# Patient Record
Sex: FEMALE | Race: BLACK OR AFRICAN AMERICAN | NOT HISPANIC OR LATINO | Employment: FULL TIME | URBAN - METROPOLITAN AREA
[De-identification: names, ages, dates, MRNs, and addresses within clinical notes are randomized per-mention and may not be internally consistent; named-entity substitution may affect disease eponyms.]

---

## 2020-12-02 LAB — CRC RECOMMENDATION EXT: NORMAL

## 2024-07-03 ENCOUNTER — OFFICE VISIT (OUTPATIENT)
Dept: FAMILY MEDICINE | Facility: CLINIC | Age: 59
End: 2024-07-03
Payer: COMMERCIAL

## 2024-07-03 ENCOUNTER — DOCUMENTATION ONLY (OUTPATIENT)
Dept: FAMILY MEDICINE | Facility: CLINIC | Age: 59
End: 2024-07-03

## 2024-07-03 ENCOUNTER — HOSPITAL ENCOUNTER (OUTPATIENT)
Dept: RADIOLOGY | Facility: HOSPITAL | Age: 59
Discharge: HOME | End: 2024-07-03
Attending: NURSE PRACTITIONER
Payer: COMMERCIAL

## 2024-07-03 VITALS
DIASTOLIC BLOOD PRESSURE: 80 MMHG | RESPIRATION RATE: 20 BRPM | WEIGHT: 141 LBS | HEIGHT: 62 IN | SYSTOLIC BLOOD PRESSURE: 152 MMHG | TEMPERATURE: 98 F | HEART RATE: 63 BPM | BODY MASS INDEX: 25.95 KG/M2 | OXYGEN SATURATION: 99 %

## 2024-07-03 DIAGNOSIS — Z12.11 COLON CANCER SCREENING: ICD-10-CM

## 2024-07-03 DIAGNOSIS — R03.0 ELEVATED BLOOD PRESSURE READING: ICD-10-CM

## 2024-07-03 DIAGNOSIS — Z00.00 WELLNESS EXAMINATION: ICD-10-CM

## 2024-07-03 DIAGNOSIS — Z12.4 PAP SMEAR FOR CERVICAL CANCER SCREENING: ICD-10-CM

## 2024-07-03 DIAGNOSIS — Z12.39 ENCOUNTER FOR SCREENING FOR MALIGNANT NEOPLASM OF BREAST, UNSPECIFIED SCREENING MODALITY: ICD-10-CM

## 2024-07-03 DIAGNOSIS — Z12.39 ENCOUNTER FOR SCREENING FOR MALIGNANT NEOPLASM OF BREAST, UNSPECIFIED SCREENING MODALITY: Primary | ICD-10-CM

## 2024-07-03 PROCEDURE — 77067 SCR MAMMO BI INCL CAD: CPT | Mod: TC

## 2024-07-03 PROCEDURE — 77063 BREAST TOMOSYNTHESIS BI: CPT | Mod: 26,,, | Performed by: STUDENT IN AN ORGANIZED HEALTH CARE EDUCATION/TRAINING PROGRAM

## 2024-07-03 PROCEDURE — 77067 SCR MAMMO BI INCL CAD: CPT | Mod: 26,,, | Performed by: STUDENT IN AN ORGANIZED HEALTH CARE EDUCATION/TRAINING PROGRAM

## 2024-07-03 RX ORDER — CYANOCOBALAMIN (VITAMIN B-12) 500 MCG
1 TABLET ORAL DAILY
COMMUNITY
Start: 2022-09-07

## 2024-07-03 NOTE — PROGRESS NOTES
Subjective:      Patient ID: Claudette M Malveaux Washington is a 58 y.o. Black or  female      Chief Complaint: Annual Exam (Wellness)       Past Medical History:   Diagnosis Date    Raynaud's syndrome         HPI  Presents to clinic for Annual Wellness.    The patient states that she feels well and would describe her overall health as good.  She states that she currently exercises 2/x week; she plays tennis. The patient states that she eats a well-balanced diet      Preventative Health:  Labs due and ordered  Colorectal cancer screening: done in 2020 ([place]); Polyp removed; Repeat due 2025  Cervical cancer screening: PAP due; done in office today  Breast cancer screening: Due and ordered  Osteoporosis screening: Not yet due  Alcohol use: Occasional  Tobacco use: Denies  Illicit drug use: Denies    BP elevated today.  Denies any history of HTN.  States BP normal at home. Denies any headaches, weakness, dizziness, CP, or SOB. Denies any other problems.              Patient Care Team:  Rickie Pedraza MD as PCP - General (Internal Medicine)      Review of Systems   Constitutional: Negative.  Negative for activity change, appetite change, chills, fever and unexpected weight change.   HENT: Negative.  Negative for hearing loss, rhinorrhea and trouble swallowing.    Eyes: Negative.  Negative for discharge, redness and visual disturbance.   Respiratory: Negative.  Negative for chest tightness, shortness of breath and wheezing.    Cardiovascular:  Positive for palpitations. Negative for chest pain.   Gastrointestinal: Negative.  Negative for blood in stool, constipation, diarrhea and vomiting.   Endocrine: Negative.  Negative for polydipsia and polyuria.   Genitourinary: Negative.  Negative for difficulty urinating, dysuria, hematuria and menstrual problem.   Musculoskeletal:  Positive for arthralgias and joint swelling. Negative for neck pain.   Integumentary:  Negative.   Allergic/Immunologic:  Negative.    Neurological: Negative.  Negative for weakness and headaches.   Psychiatric/Behavioral: Negative.  Negative for confusion and dysphoric mood.    All other systems reviewed and are negative.          Objective:      Vitals:    07/03/24 0855   BP: (!) 152/80   Pulse:    Resp:    Temp:       Body mass index is 25.79 kg/m².     Physical Exam  Vitals reviewed. Exam conducted with a chaperone present (NEENA Jones).   Constitutional:       Appearance: Normal appearance.   HENT:      Head: Normocephalic.      Mouth/Throat:      Mouth: Mucous membranes are moist.   Eyes:      Conjunctiva/sclera: Conjunctivae normal.      Pupils: Pupils are equal, round, and reactive to light.   Cardiovascular:      Rate and Rhythm: Normal rate and regular rhythm.   Pulmonary:      Effort: Pulmonary effort is normal. No respiratory distress.      Breath sounds: Normal breath sounds.   Abdominal:      General: Bowel sounds are normal. There is no distension.      Palpations: Abdomen is soft.   Genitourinary:     General: Normal vulva.      Labia:         Right: No rash, tenderness or lesion.         Left: No rash, tenderness or lesion.       Vagina: Normal.      Cervix: Normal. No friability, lesion or erythema.      Rectum: Normal.   Musculoskeletal:         General: Normal range of motion.      Cervical back: Normal range of motion and neck supple.   Skin:     General: Skin is warm and dry.   Neurological:      Mental Status: She is alert and oriented to person, place, and time.   Psychiatric:         Mood and Affect: Mood normal.            Current Outpatient Medications:     folic acid 0.8 mg Cap, Take 1 mg by mouth once daily., Disp: , Rfl:     Assessment & Plan:     Problem List Items Addressed This Visit          Cardiac/Vascular    Elevated blood pressure reading     BP elevated; Asymptomatic  No history of HTN  Daily BP check; bring log to all appointments  Report to ED for any BP >200/100, SOB, CP, and/or worsening  symptoms  RTC in 1 month for reevaluation  Verbalizes all understanding              Renal/    Pap smear for cervical cancer screening - Primary     PAP collected today; tolerated well  Will call with results         Relevant Orders    Liquid-Based Pap Smear, Screening       Other    Wellness examination     Labs due and ordered  Colorectal cancer screening: done in 2020 ([place]); Polyp removed; Repeat due 2025;   Cervical cancer screening: PAP due; done in office today  Breast cancer screening: Due and ordered  Osteoporosis screening: Not yet due  Alcohol use: Occasional  Tobacco use: Denies  Illicit drug use: Denies         Relevant Orders    CBC Auto Differential    Comprehensive Metabolic Panel    Lipid Panel    TSH    Hemoglobin A1C    Urinalysis    Hepatitis C Antibody    HIV 1/2 Ag/Ab (4th Gen)

## 2024-07-03 NOTE — ASSESSMENT & PLAN NOTE
Labs due and ordered  Colorectal cancer screening: done in 2020 ([place]); Polyp removed; Repeat due 2025;   Cervical cancer screening: PAP due; done in office today  Breast cancer screening: Due and ordered  Osteoporosis screening: Not yet due  Alcohol use: Occasional  Tobacco use: Denies  Illicit drug use: Denies

## 2024-07-03 NOTE — ASSESSMENT & PLAN NOTE
BP elevated; Asymptomatic  No history of HTN  Daily BP check; bring log to all appointments  Report to ED for any BP >200/100, SOB, CP, and/or worsening symptoms  RTC in 1 month for reevaluation  Verbalizes all understanding

## 2024-07-08 ENCOUNTER — TELEPHONE (OUTPATIENT)
Dept: FAMILY MEDICINE | Facility: CLINIC | Age: 59
End: 2024-07-08
Payer: COMMERCIAL

## 2024-07-08 DIAGNOSIS — Z91.89 AT HIGH RISK FOR BREAST CANCER: Primary | ICD-10-CM

## 2024-07-08 NOTE — TELEPHONE ENCOUNTER
Breast surgeon I would recommend is Dr. Thelma Morales or Dr. Luis Connolly; is she agreeable to either?

## 2024-07-08 NOTE — TELEPHONE ENCOUNTER
----- Message from Sofy Willard MD sent at 7/5/2024 11:50 AM CDT -----    ----- Message -----  From: Interface, Rad Results In  Sent: 7/5/2024  11:16 AM CDT  To: Diana Winters NP

## 2024-07-08 NOTE — TELEPHONE ENCOUNTER
Pt notified or mammogram results and high risk of breast cancer. Pt amendable to rotating MRI and mammograms and to seeing a breast specialist. Pt would like call back with breast specialist name. Please advise. Thanks

## 2024-07-08 NOTE — TELEPHONE ENCOUNTER
Please call patient  Her mammogram did not show any signs of breast cancer however they did calculate based on her history, her lifetime risk of breast cancer is high at 30%  Given this information our breast radiologist do recommend that we start alternating a breast MRI every 6 months, then the following 6 months with a breast mammogram to monitor her; also that we get her established with a breast specialist  I do agree with this recommendation as we have seen a higher incidence of breast cancer in our region, if she is agreeable to MRI or atleast to establish with a breast specialist please let me know so I can work on this  Thanks

## 2024-07-09 ENCOUNTER — DOCUMENTATION ONLY (OUTPATIENT)
Dept: FAMILY MEDICINE | Facility: CLINIC | Age: 59
End: 2024-07-09
Payer: COMMERCIAL

## 2024-07-09 LAB — PAP RECOMMENDATION EXT: NORMAL

## 2024-07-09 NOTE — TELEPHONE ENCOUNTER
Spoke with pt  Advised that Dr Pedraza recommends either Dr Morales or Dr Connolly  Pt stated she prefers a female physician   Please place referral to Dr Thelma Morales  Thanks

## 2024-07-09 NOTE — TELEPHONE ENCOUNTER
I have signed for the following orders AND/OR meds. Please notify the patient and ask the patient to schedule the testing and/or information about any medications that were sent.         Orders Placed This Encounter   Procedures    MRI Breast Bilateral Without Contrast     Standing Status:   Future     Standing Expiration Date:   7/9/2025     Order Specific Question:   Does the patient have or ever had a pacemaker or a defibrillator (Note: Some facilities may not be able to schedule an MRI for patients with pacemakers and defibrillators. You should contact your local radiology dept to determine if this is the case.)?     Answer:   No     Order Specific Question:   Does the patient have an aneurysm or surgical clip, pump, nerve/brain stimulator, middle/inner ear prosthesis, or other metal implant or foreign object (bullet, shrapnel)? If they have a card related to their implant, ask them to bring it. Issues related to the implant may cause the MRI to be delayed.     Answer:   No     Order Specific Question:   Will the patient require po anxiolysis or sedation?     Answer:   No     Order Specific Question:   May the Radiologist modify the order per protocol to meet the clinical needs of the patient?     Answer:   Yes     Order Specific Question:   Does the patient have on a skin patch for medication with aluminized backing?     Answer:   No    Ambulatory referral/consult to Breast Surgery     Standing Status:   Future     Standing Expiration Date:   8/9/2025     Referral Priority:   Routine     Referral Type:   Consultation     Referral Reason:   Specialty Services Required     Referred to Provider:   Thelma Morales MD     Requested Specialty:   Breast Surgery     Number of Visits Requested:   1

## 2024-07-29 RX ORDER — IBUPROFEN 200 MG
200 TABLET ORAL
COMMUNITY

## 2024-07-30 ENCOUNTER — OFFICE VISIT (OUTPATIENT)
Dept: SURGERY | Facility: CLINIC | Age: 59
End: 2024-07-30
Payer: COMMERCIAL

## 2024-07-30 VITALS
WEIGHT: 143.19 LBS | BODY MASS INDEX: 26.35 KG/M2 | SYSTOLIC BLOOD PRESSURE: 135 MMHG | HEIGHT: 62 IN | RESPIRATION RATE: 18 BRPM | TEMPERATURE: 98 F | DIASTOLIC BLOOD PRESSURE: 80 MMHG | HEART RATE: 63 BPM | OXYGEN SATURATION: 100 %

## 2024-07-30 DIAGNOSIS — Z80.3 FAMILY HISTORY OF BREAST CANCER: ICD-10-CM

## 2024-07-30 DIAGNOSIS — Z12.31 SCREENING MAMMOGRAM FOR BREAST CANCER: ICD-10-CM

## 2024-07-30 DIAGNOSIS — Z91.89 AT HIGH RISK FOR BREAST CANCER: Primary | ICD-10-CM

## 2024-07-30 PROCEDURE — 3008F BODY MASS INDEX DOCD: CPT | Mod: CPTII,S$GLB,,

## 2024-07-30 PROCEDURE — 3044F HG A1C LEVEL LT 7.0%: CPT | Mod: CPTII,S$GLB,,

## 2024-07-30 PROCEDURE — 3075F SYST BP GE 130 - 139MM HG: CPT | Mod: CPTII,S$GLB,,

## 2024-07-30 PROCEDURE — 99205 OFFICE O/P NEW HI 60 MIN: CPT | Mod: S$GLB,,,

## 2024-07-30 PROCEDURE — 99999 PR PBB SHADOW E&M-EST. PATIENT-LVL IV: CPT | Mod: PBBFAC,,,

## 2024-07-30 PROCEDURE — 3079F DIAST BP 80-89 MM HG: CPT | Mod: CPTII,S$GLB,,

## 2024-07-30 PROCEDURE — 1159F MED LIST DOCD IN RCRD: CPT | Mod: CPTII,S$GLB,,

## 2024-07-30 PROCEDURE — 1160F RVW MEDS BY RX/DR IN RCRD: CPT | Mod: CPTII,S$GLB,,

## 2024-07-30 NOTE — PROGRESS NOTES
[facility] Breast Surg  Breast Surgical Oncology  New Patient Office Visit - H&P      Referring Provider: Dr. Rickie Pedraza  PCP: Rickie Pedraza MD   Care Team:  OBGYN: No data on file.    Chief Complaint:   Chief Complaint   Patient presents with    Breast Cancer Screening     New High Risk patient visit.         Subjective:     HPI:  Claudette M Malveaux Washington is a 59 y.o. female who presents on 7/30/2024 for evaluation of risk for breast cancer. Based on the Tyrer-Cuzick Breast Cancer Risk Model, her lifetime risk is calculated to be 34.4% and Tere 5 year risk score 2.2%.    Patient is doing well today. She currently denies any breast issues including rashes, redness, pain, swelling, nipple discharge, or new lumps/masses.  She underwent screening mammogram earlier this month which resulted as negative.  She has never undergone any prior breast surgeries or breast biopsies.  She has not undergone genetic testing.  Patient is postmenopausal and she went through menopause around the age of 52.  She performs regular self-breast exams.  Patient states she lives a relatively healthy lifestyle.  She exercises by playing tennis with her niece a couple of times per week.  She does not smoke and she drinks alcohol in moderation.  She has no other questions or concerns today.      Of note, patient's mother was diagnosed with breast cancer at the age of 65.  Her mother was treated with lumpectomy, radiation, and chemoprevention.  Patient states her mother was in remission for about 5 years before she developed angiosarcoma of her breast which she passed away from shortly after diagnosis.  Patient states angiosarcoma was caused by radiation.  Patient's father was diagnosed with leukemia at age 82.  Patient's maternal grandmother was diagnosed with breast cancer at the age of 80.  She has a paternal aunt who was diagnosed with colon cancer at the age of 60.  Patient states none of these  family members were genetically tested.    Imagin/3/2024 SC MG - There is no mammographic evidence of malignancy. BI-RADS: 1 Negative     Pathology:   none    OB/GYN History:  Age at Menarche Onset: 15  Menopausal Status: postmenopausal, LMP: No LMP recorded. Patient is postmenopausal.  Hysterectomy/Oophorectomy: NA  Hormonal birth control (duration): 10 years   Pregnancy History:   Age at first live birth: 31  Hormone Replacement Therapy: No, none    Other:  MG breast density: BIRADS D   Prior thoracic RT: none  Genetic testing: none  Ashkenazi Jew descent: No    Family History:  Family History   Problem Relation Name Age of Onset    Breast cancer Mother  65    Heart disease Mother      Leukemia Father      Hypertension Father      Cerebral aneurysm Brother      Breast cancer Paternal Grandmother  75    Colon cancer Paternal Aunt  70        Patient History:  Past Medical History:   Diagnosis Date    Raynaud's syndrome        Past Surgical History:   Procedure Laterality Date     SECTION      x 2       Social History     Socioeconomic History    Marital status:    Tobacco Use    Smoking status: Never    Smokeless tobacco: Never   Substance and Sexual Activity    Alcohol use: Yes     Comment: Socially (3-4 times a week)    Drug use: Never    Sexual activity: Yes     Partners: Male     Social Determinants of Health     Financial Resource Strain: Low Risk  (2024)    Overall Financial Resource Strain (CARDIA)     Difficulty of Paying Living Expenses: Not very hard   Food Insecurity: No Food Insecurity (2024)    Hunger Vital Sign     Worried About Running Out of Food in the Last Year: Never true     Ran Out of Food in the Last Year: Never true   Physical Activity: Sufficiently Active (2024)    Exercise Vital Sign     Days of Exercise per Week: 3 days     Minutes of Exercise per Session: 150+ min   Stress: Stress Concern Present (2024)    [employer]  "- Occupational Stress Questionnaire     Feeling of Stress : To some extent   Housing Stability: Unknown (7/1/2024)    Housing Stability Vital Sign     Unable to Pay for Housing in the Last Year: No       Immunization History   Administered Date(s) Administered    COVID-19, MRNA, LN-S, PF (MODERNA FULL 0.5 ML DOSE) 03/31/2021, 04/30/2021, 03/01/2022    Influenza - Quadrivalent - PF *Preferred* (6 months and older) 09/24/2022    Zoster Recombinant 08/17/2021, 11/16/2021       Medications/Allergies:    Current Outpatient Medications:     folic acid 0.8 mg Cap, Take 1 mg by mouth once daily., Disp: , Rfl:     ibuprofen (ADVIL,MOTRIN) 200 MG tablet, Take 200 mg by mouth as needed for Pain., Disp: , Rfl:      Review of patient's allergies indicates:   Allergen Reactions    Sulfa (sulfonamide antibiotics) Hives and Itching       Review of Systems:  All pertinent history mentioned in HPI.     Objective:     Vitals:  Vitals:    07/30/24 1256   BP: 135/80   BP Location: Left arm   Patient Position: Sitting   BP Method: Medium (Automatic)   Pulse: 63   Resp: 18   Temp: 97.9 °F (36.6 °C)   TempSrc: Oral   SpO2: 100%   Weight: 65 kg (143 lb 3.2 oz)   Height: 5' 2" (1.575 m)       Body mass index is 26.19 kg/m².     Physical Exam:  General: The patient is awake, alert and oriented times three. The patient is well nourished and in no acute distress.  Neck: There is no evidence of palpable cervical, supraclavicular or axillary adenopathy. The neck is supple. The thyroid is not enlarged.  Musculoskeletal: The patient has a normal range of motion of her bilateral upper extremities.  Chest: Examination of the chest wall fails to reveal any obvious abnormalities.  The lungs are clear to auscultation bilaterally without rales, rhonchi, or wheezing.  Cardiovascular: The heart has a regular rate and rhythm without murmurs, gallops or rubs.  Breast:   Right:  Examination of right breast fails to reveal any dominant masses or areas of " significant focal nodularity. The nipple is everted without evidence of discharge. There is no skin dimpling with movement of the pectoralis. There is no significant skin changes overlying the breast.   Left:  Examination of the left breast fails to reveal any dominant masses or areas of significant focal nodularity. The nipple is everted without evidence of discharge. There is no skin dimpling with movement of the pectoralis. There are no significant skin changes overlying the breast.  Abdomen: The abdomen is soft, flat, nontender and nondistended with no palpable masses or organomegaly.  Integumentary: no rashes or skin lesions present  Neurologic: cranial nerves intact, no signs of peripheral neurological deficit, motor/sensory function intact    Assessment:     Patient Active Problem List   Diagnosis    Family history of breast cancer in mother    Pap smear for cervical cancer screening    Wellness examination    Elevated blood pressure reading        Claudette was seen today for breast cancer screening.    Diagnoses and all orders for this visit:    At high risk for breast cancer  -     Ambulatory referral/consult to Breast Surgery  -     Mammo Digital Screening Bilat w/ Jf; Future  -     MRI Screening Breast W/WO Contrast, W/CAD, Thomas; Future    Screening mammogram for breast cancer  -     Mammo Digital Screening Bilat w/ Jf; Future    Family history of breast cancer  -     Mammo Digital Screening Bilat w/ Jf; Future  -     MRI Screening Breast W/WO Contrast, W/CAD, Thomas; Future           --------------------------------------------------------------------------------------------------------------  After the initial clinical evaluation nearly 30 minutes were on counseling the patients regarding the options for management. Risk reduction strategies were discussed.     1. Lifestyle factors: As with other types of cancer, studies continue to show that various lifestyle factors may contribute to the  development of breast cancer.     Weight: Recent studies have shown that postmenopausal women who are overweight or obese have an increased risk of breast cancer. These women also have a higher risk of having the cancer come back after treatment.     Physical activity: Decreased physical activity is associated with an increased risk of developing breast cancer and a higher risk of having the cancer come back after treatment. Regular physical activity may protect against breast cancer by helping women maintain a healthy body weight, lowering hormone levels, or causing changes in a womens metabolism or immune factors.     Alcohol: Current research suggests that having more than 1 to 2 alcoholic drinks, including beer, wine, and spirits, per day raises the risk of breast cancer, as well as the risk of having the cancer come back after treatment.     Food: There is no reliable research that confirms that eating or avoiding specific foods reduces the risk of developing breast cancer or having the cancer come back after treatment. However, eating more fruits and vegetables and fewer animal fats is linked with many health benefits.     2. Prevention:  Surgery to lower cancer risk: For women with BRCA1 or BRCA2 genetic mutations, which substantially increase the risk of breast cancer, preventive removal of the breasts may be considered. The procedure, called a prophylactic mastectomy, appears to reduce the risk of developing breast cancer by at least 95%. Women with these mutations should also consider the preventive removal of the ovaries and fallopian tubes, called a prophylactic salpingo-oophorectomy. This procedure can reduce the risk of developing ovarian cancer, as well as breast cancer, by stopping the ovaries from making estrogen.      Drugs to lower cancer risk (Chemoprevention): Women who have a higher than usual risk of developing breast cancer may consider certain drugs that may help prevent breast cancer. This  "approach may also be called "chemoprevention." For breast cancer, this is the use of hormone-blocking drugs to reduce cancer risk. The drugs, tamoxifen (Soltamox) and raloxifene (Evista), are approved by the U.S. Food and Drug Administration (FDA) to lower breast cancer risk. These drugs are called selective estrogen receptor modulators (SERMs) and are not chemotherapy. A SERM is a medication that blocks estrogen receptors in some tissues and not others. Both women who have gone through menopause and those who have not may take tamoxifen. Raloxifene is only approved for women who have gone through menopause. Each drug also has different side effects.     Aromatase inhibitors (AIs) have also been shown to lower breast cancer risk. AIs are a type of hormone-blocking treatment that reduces the amount of estrogen in a woman's body by stopping tissues and organs other than the ovaries from producing estrogen. They can only be used by women who have gone through menopause. However, no AIs have been approved by the FDA for lowering breast cancer risk in women who do not have the disease.     3. Surveillance: Women with a known genetic mutation should follow screening guidelines per the NCCN guidelines. Women with no known genetic mutation  and found to be at greater than 20 percent average lifetime risk of breast cancer are recommended for the following screening recommendations:     Clinical Breast exam: Every 6-12 months starting at age found to be at increased risk by risk model     Mammogram: Per NCCN guidelines, recommended every year starting 10 years younger than the youngest breast cancer case in the family (but not before age 30). May consider beginning breast MRIs at age 30 per ACR guidelines if desired by patient or other clinical considerations. May also consider getting a baseline MG at time of initial high risk consultation, if not already obtained.     Breast MRI: Per NCCN guidelines, recommended every year " starting 10 years younger than the youngest breast cancer case in the family (but not before age 25). May consider beginning breast MRIs at age 30 per ACR guidelines if desired by patient or other clinical considerations. If patient has a first-degree relative with a BRCA1/2 gene mutation, youre encouraged to get genetic counseling and/or testing before getting MRI as part of screening (for those who do not wish to have genetic testing, MRI is recommended). Breast MRI in combination with mammography is better than mammography alone at finding breast cancer in certain women at higher than average risk.     --------------------------------------------------------------------------------------------------------------     Plan:       1. Lifestyle - Healthy lifestyle guidelines were reviewed. She was encouraged to engage in regular exercise, maintain a healthy body weight, and avoid excessive alcohol consumption. Healthy nutritional guidelines were also discussed. Self-breast examination was reviewed with the patient in detail and she was encouraged to perform this on a monthly basis.    2. Surveillance - She desires undergoing high risk screening with annual screening mammograms and breast MRIs. In the absence of significant clinical findings in the interval, I recommend breast MRI in January 2025 in screening mammogram in July 2025.  RTC in 1 year.    3. Prevention - We had a brief discussion/education about indications for preventative mastectomy or chemoprevention. Patient displayed no interest in starting chemoprevention.  Patient's 5 year Tere score is 2.2%.      4. Genetics - According to NCCN guidelines, she does not currently meet criteria for genetic testing. Will continue to monitor.    5. Other routine screening exams:   -  Recommend annual follow up with PCP   -  Recommend annual follow up with GYN for pap smears/gynecologic exams and CBE   -  GI: Recommend start colorectal cancer screening at age 45 in  average risk individuals. This can be done either with a sensitive test that looks for signs of cancer in a persons stool (a stool-based test), or with an exam that looks at the colon and rectum (a visual exam). Patient's at an increased risk for CRC (ex. Personal or family history of CRC, certain types of polyps, genetic disorders, IBD, or hx of abdominal radiation) should start screening prior to age 45.         All of her questions were answered. She was advised to call if she develops any questions or concerns.    Gila Acuña PA-C     --------------------------------------------------------------------------------------------------------------  Total time on the date of the visit ranged from 60-74 mins (48895). Total time includes both face-to-face and non-face-to-face time personally spent by myself on the day of the visit.    Non-face-to-face time included:  _X_ preparing to see the patient such as reviewing the patient record  _X_ obtaining and reviewing separately obtained history  _X_ independently interpreting results  _X_ documenting clinical information in electronic health record.    Face-to-face time included:  _X_ performing an appropriate history and examination  _X_ communicating results to the patient  _X_ counseling and educating the patient  __ ordering appropriate medications  _x_ ordering appropriate tests  _X_ ordering appropriate procedures (including follow-up)  _X_ answering any questions the patient had    Total Time spent on date of visit: 74 minutes

## 2024-08-06 ENCOUNTER — OFFICE VISIT (OUTPATIENT)
Dept: FAMILY MEDICINE | Facility: CLINIC | Age: 59
End: 2024-08-06
Payer: COMMERCIAL

## 2024-08-06 VITALS
SYSTOLIC BLOOD PRESSURE: 137 MMHG | TEMPERATURE: 97 F | BODY MASS INDEX: 25.88 KG/M2 | DIASTOLIC BLOOD PRESSURE: 76 MMHG | WEIGHT: 140.63 LBS | RESPIRATION RATE: 19 BRPM | OXYGEN SATURATION: 98 % | HEIGHT: 62 IN | HEART RATE: 73 BPM

## 2024-08-06 DIAGNOSIS — R03.0 ELEVATED BLOOD PRESSURE READING: Primary | ICD-10-CM

## 2024-08-06 PROCEDURE — 3075F SYST BP GE 130 - 139MM HG: CPT | Mod: CPTII,,, | Performed by: NURSE PRACTITIONER

## 2024-08-06 PROCEDURE — 1160F RVW MEDS BY RX/DR IN RCRD: CPT | Mod: CPTII,,, | Performed by: NURSE PRACTITIONER

## 2024-08-06 PROCEDURE — 3008F BODY MASS INDEX DOCD: CPT | Mod: CPTII,,, | Performed by: NURSE PRACTITIONER

## 2024-08-06 PROCEDURE — 3044F HG A1C LEVEL LT 7.0%: CPT | Mod: CPTII,,, | Performed by: NURSE PRACTITIONER

## 2024-08-06 PROCEDURE — 3078F DIAST BP <80 MM HG: CPT | Mod: CPTII,,, | Performed by: NURSE PRACTITIONER

## 2024-08-06 PROCEDURE — 1159F MED LIST DOCD IN RCRD: CPT | Mod: CPTII,,, | Performed by: NURSE PRACTITIONER

## 2024-08-06 PROCEDURE — 99214 OFFICE O/P EST MOD 30 MIN: CPT | Mod: ,,, | Performed by: NURSE PRACTITIONER

## 2024-08-26 ENCOUNTER — OFFICE VISIT (OUTPATIENT)
Dept: FAMILY MEDICINE | Facility: CLINIC | Age: 59
End: 2024-08-26
Payer: COMMERCIAL

## 2024-08-26 VITALS
SYSTOLIC BLOOD PRESSURE: 137 MMHG | WEIGHT: 140 LBS | HEIGHT: 62 IN | TEMPERATURE: 98 F | DIASTOLIC BLOOD PRESSURE: 85 MMHG | HEART RATE: 63 BPM | BODY MASS INDEX: 25.76 KG/M2 | RESPIRATION RATE: 18 BRPM | OXYGEN SATURATION: 99 %

## 2024-08-26 DIAGNOSIS — M79.605 LEFT LEG PAIN: Primary | ICD-10-CM

## 2024-08-26 PROCEDURE — 3008F BODY MASS INDEX DOCD: CPT | Mod: CPTII,,, | Performed by: FAMILY MEDICINE

## 2024-08-26 PROCEDURE — 3075F SYST BP GE 130 - 139MM HG: CPT | Mod: CPTII,,, | Performed by: FAMILY MEDICINE

## 2024-08-26 PROCEDURE — 3044F HG A1C LEVEL LT 7.0%: CPT | Mod: CPTII,,, | Performed by: FAMILY MEDICINE

## 2024-08-26 PROCEDURE — 99213 OFFICE O/P EST LOW 20 MIN: CPT | Mod: ,,, | Performed by: FAMILY MEDICINE

## 2024-08-26 PROCEDURE — 1159F MED LIST DOCD IN RCRD: CPT | Mod: CPTII,,, | Performed by: FAMILY MEDICINE

## 2024-08-26 PROCEDURE — 3079F DIAST BP 80-89 MM HG: CPT | Mod: CPTII,,, | Performed by: FAMILY MEDICINE

## 2024-08-26 PROCEDURE — 1160F RVW MEDS BY RX/DR IN RCRD: CPT | Mod: CPTII,,, | Performed by: FAMILY MEDICINE

## 2024-08-26 RX ORDER — KETOROLAC TROMETHAMINE 10 MG/1
10 TABLET, FILM COATED ORAL EVERY 6 HOURS
Qty: 20 TABLET | Refills: 0 | Status: SHIPPED | OUTPATIENT
Start: 2024-08-26 | End: 2024-08-31

## 2024-08-26 RX ORDER — CYCLOBENZAPRINE HCL 5 MG
5 TABLET ORAL 3 TIMES DAILY PRN
Qty: 21 TABLET | Refills: 0 | Status: SHIPPED | OUTPATIENT
Start: 2024-08-26 | End: 2024-09-02

## 2024-08-28 NOTE — PROGRESS NOTES
Subjective:      Patient ID: Claudette M Malveaux Washington is a 59 y.o. female.    Chief Complaint: Leg Pain (Left posterior thigh, on and off 5 years)    Disclaimer:  This note is prepared using voice recognition software and as such is likely to have errors despite attempts at proofreading. Please contact me for questions.     59-year-old female who presents for acute on chronic left posterior thigh pain.  The patient states that symptoms have been intermittent for the last few years.  She states that she has been diagnosed with multiple possible concerns including gluteal bursitis.  She describes the pain as originating in the buttocks and occasionally lower back as well as radiating to the thigh.  She denies numbness and tingling.  The patient also denies any trauma.      Past Medical History:   Diagnosis Date    Raynaud's syndrome         Current Outpatient Medications on File Prior to Visit   Medication Sig Dispense Refill    folic acid 0.8 mg Cap Take 1 mg by mouth once daily.      ibuprofen (ADVIL,MOTRIN) 200 MG tablet Take 200 mg by mouth as needed for Pain.       No current facility-administered medications on file prior to visit.        Review of patient's allergies indicates:   Allergen Reactions    Sulfa (sulfonamide antibiotics) Hives and Itching      Review of Systems   HENT:  Negative for hearing loss.    Eyes:  Negative for discharge.   Respiratory:  Negative for wheezing.    Cardiovascular:  Negative for chest pain and palpitations.   Gastrointestinal:  Negative for blood in stool, constipation, diarrhea and vomiting.   Genitourinary:  Negative for dysuria and hematuria.   Musculoskeletal:  Positive for myalgias. Negative for falls and neck pain.   Neurological:  Negative for weakness and headaches.   Endo/Heme/Allergies:  Negative for polydipsia.       Objective:     Vitals:    08/26/24 1556   BP: 137/85   Pulse: 63   Resp: 18   Temp: 98.3 °F (36.8 °C)   TempSrc: Temporal   SpO2: 99%   Weight:  "63.5 kg (140 lb)   Height: 5' 2" (1.575 m)     Physical Exam  Constitutional:       General: She is not in acute distress.     Appearance: Normal appearance. She is not ill-appearing, toxic-appearing or diaphoretic.   HENT:      Head: Normocephalic and atraumatic.      Right Ear: External ear normal.      Left Ear: External ear normal.      Nose: Nose normal.   Eyes:      Extraocular Movements: Extraocular movements intact.      Conjunctiva/sclera: Conjunctivae normal.   Pulmonary:      Effort: Pulmonary effort is normal. No respiratory distress.   Musculoskeletal:      Cervical back: Normal range of motion.      Lumbar back: Tenderness present. No swelling, edema, deformity, signs of trauma, spasms or bony tenderness. Normal range of motion. Negative right straight leg raise test and negative left straight leg raise test.      Right hip: Normal range of motion.      Left hip: Bony tenderness (Iliac crests) present. No deformity, tenderness or crepitus. Normal range of motion. Normal strength.      Right upper leg: No swelling, edema or deformity.      Left upper leg: Tenderness present. No swelling, edema, deformity or bony tenderness.      Comments: Negative FADIR, LAURA and obturator signs   Skin:     Coloration: Skin is not pale.   Neurological:      General: No focal deficit present.      Mental Status: She is alert and oriented to person, place, and time. Mental status is at baseline.   Psychiatric:         Mood and Affect: Mood normal.         Behavior: Behavior normal.         Thought Content: Thought content normal.         Judgment: Judgment normal.         Assessment:     1. Left leg pain      Plan:     1. Left leg pain  - cyclobenzaprine (FLEXERIL) 5 MG tablet; Take 1 tablet (5 mg total) by mouth 3 (three) times daily as needed for Muscle spasms.  Dispense: 21 tablet; Refill: 0  - ketorolac (TORADOL) 10 mg tablet; Take 1 tablet (10 mg total) by mouth every 6 (six) hours. for 5 days  Dispense: 20 tablet; " Refill: 0  ROM exercises given, HEP, warm compresses, consider PT if pain does not improve  Flexeril 5mg TID PRN, (counseled patient on not taking medication while driving or operating heavy machinery)  Continue NSAIDs PRN, may alternate with Tylenol  Contact clinic for any questions or concerns and notify MD if symptoms worsen or not improving.       Follow up if symptoms worsen or fail to improve.  Claudette M Malveaux Washington was given education on their disease process and medications.

## 2024-09-25 ENCOUNTER — HOSPITAL ENCOUNTER (EMERGENCY)
Facility: HOSPITAL | Age: 59
Discharge: HOME | End: 2024-09-25
Attending: EMERGENCY MEDICINE
Payer: COMMERCIAL

## 2024-09-25 VITALS
WEIGHT: 138 LBS | SYSTOLIC BLOOD PRESSURE: 158 MMHG | TEMPERATURE: 98 F | BODY MASS INDEX: 25.4 KG/M2 | HEART RATE: 69 BPM | OXYGEN SATURATION: 100 % | DIASTOLIC BLOOD PRESSURE: 95 MMHG | HEIGHT: 62 IN | RESPIRATION RATE: 19 BRPM

## 2024-09-25 DIAGNOSIS — S62.102A LEFT WRIST FRACTURE, CLOSED, INITIAL ENCOUNTER: ICD-10-CM

## 2024-09-25 DIAGNOSIS — S69.92XA LEFT WRIST INJURY: ICD-10-CM

## 2024-09-25 PROCEDURE — 96372 THER/PROPH/DIAG INJ SC/IM: CPT

## 2024-09-25 PROCEDURE — 99285 EMERGENCY DEPT VISIT HI MDM: CPT | Mod: 25

## 2024-09-25 PROCEDURE — 63600175 PHARM REV CODE 636 W HCPCS: Performed by: EMERGENCY MEDICINE

## 2024-09-25 PROCEDURE — 25605 CLTX DST RDL FX/EPHYS SEP W/: CPT | Mod: LT

## 2024-09-25 PROCEDURE — 63600175 PHARM REV CODE 636 W HCPCS

## 2024-09-25 PROCEDURE — 25000003 PHARM REV CODE 250: Performed by: EMERGENCY MEDICINE

## 2024-09-25 RX ORDER — ONDANSETRON HYDROCHLORIDE 2 MG/ML
INJECTION, SOLUTION INTRAVENOUS
Status: COMPLETED
Start: 2024-09-25 | End: 2024-09-25

## 2024-09-25 RX ORDER — PROPOFOL 10 MG/ML
INJECTION, EMULSION INTRAVENOUS
Status: DISCONTINUED
Start: 2024-09-25 | End: 2024-09-26 | Stop reason: HOSPADM

## 2024-09-25 RX ORDER — MORPHINE SULFATE 4 MG/ML
INJECTION, SOLUTION INTRAMUSCULAR; INTRAVENOUS
Status: COMPLETED
Start: 2024-09-25 | End: 2024-09-25

## 2024-09-25 RX ORDER — PROPOFOL 10 MG/ML
VIAL (ML) INTRAVENOUS CODE/TRAUMA/SEDATION MEDICATION
Status: COMPLETED | OUTPATIENT
Start: 2024-09-25 | End: 2024-09-25

## 2024-09-25 RX ORDER — OXYCODONE AND ACETAMINOPHEN 7.5; 325 MG/1; MG/1
1 TABLET ORAL EVERY 6 HOURS PRN
Qty: 20 TABLET | Refills: 0 | Status: ON HOLD | OUTPATIENT
Start: 2024-09-25 | End: 2024-09-30 | Stop reason: HOSPADM

## 2024-09-25 RX ORDER — KETOROLAC TROMETHAMINE 30 MG/ML
30 INJECTION, SOLUTION INTRAMUSCULAR; INTRAVENOUS
Status: COMPLETED | OUTPATIENT
Start: 2024-09-25 | End: 2024-09-25

## 2024-09-25 RX ORDER — OXYCODONE AND ACETAMINOPHEN 10; 325 MG/1; MG/1
1 TABLET ORAL
Status: COMPLETED | OUTPATIENT
Start: 2024-09-25 | End: 2024-09-25

## 2024-09-25 RX ADMIN — KETOROLAC TROMETHAMINE 30 MG: 30 INJECTION, SOLUTION INTRAMUSCULAR at 08:09

## 2024-09-25 RX ADMIN — PROPOFOL 20 MG: 10 INJECTION, EMULSION INTRAVENOUS at 09:09

## 2024-09-25 RX ADMIN — ONDANSETRON 4 MG: 2 INJECTION INTRAMUSCULAR; INTRAVENOUS at 09:09

## 2024-09-25 RX ADMIN — OXYCODONE AND ACETAMINOPHEN 1 TABLET: 10; 325 TABLET ORAL at 10:09

## 2024-09-25 RX ADMIN — MORPHINE SULFATE 4 MG: 4 INJECTION, SOLUTION INTRAMUSCULAR; INTRAVENOUS at 09:09

## 2024-09-25 RX ADMIN — PROPOFOL 30 MG: 10 INJECTION, EMULSION INTRAVENOUS at 09:09

## 2024-09-26 ENCOUNTER — ANESTHESIA EVENT (OUTPATIENT)
Dept: SURGERY | Facility: HOSPITAL | Age: 59
End: 2024-09-26
Payer: COMMERCIAL

## 2024-09-26 ENCOUNTER — OFFICE VISIT (OUTPATIENT)
Dept: ORTHOPEDICS | Facility: CLINIC | Age: 59
End: 2024-09-26
Payer: COMMERCIAL

## 2024-09-26 ENCOUNTER — LAB VISIT (OUTPATIENT)
Dept: LAB | Facility: HOSPITAL | Age: 59
End: 2024-09-26
Attending: ORTHOPAEDIC SURGERY
Payer: COMMERCIAL

## 2024-09-26 VITALS
SYSTOLIC BLOOD PRESSURE: 143 MMHG | DIASTOLIC BLOOD PRESSURE: 80 MMHG | HEART RATE: 58 BPM | HEIGHT: 62 IN | WEIGHT: 138 LBS | BODY MASS INDEX: 25.4 KG/M2

## 2024-09-26 DIAGNOSIS — S52.532A CLOSED COLLES' FRACTURE OF LEFT RADIUS, INITIAL ENCOUNTER: ICD-10-CM

## 2024-09-26 DIAGNOSIS — S52.615A NONDISPLACED FRACTURE OF LEFT ULNA STYLOID PROCESS, INITIAL ENCOUNTER FOR CLOSED FRACTURE: ICD-10-CM

## 2024-09-26 DIAGNOSIS — S52.532A CLOSED COLLES' FRACTURE OF LEFT RADIUS, INITIAL ENCOUNTER: Primary | ICD-10-CM

## 2024-09-26 DIAGNOSIS — M19.90 OSTEOARTHRITIS: ICD-10-CM

## 2024-09-26 LAB
ALBUMIN SERPL-MCNC: 4.1 G/DL (ref 3.5–5)
ALBUMIN/GLOB SERPL: 1.2 RATIO (ref 1.1–2)
ALP SERPL-CCNC: 47 UNIT/L (ref 40–150)
ALT SERPL-CCNC: 11 UNIT/L (ref 0–55)
ANION GAP SERPL CALC-SCNC: 8 MEQ/L
AST SERPL-CCNC: 23 UNIT/L (ref 5–34)
BASOPHILS # BLD AUTO: 0.02 X10(3)/MCL
BASOPHILS NFR BLD AUTO: 0.3 %
BILIRUB SERPL-MCNC: 0.6 MG/DL
BUN SERPL-MCNC: 15 MG/DL (ref 9.8–20.1)
CALCIUM SERPL-MCNC: 9.5 MG/DL (ref 8.4–10.2)
CHLORIDE SERPL-SCNC: 107 MMOL/L (ref 98–107)
CO2 SERPL-SCNC: 26 MMOL/L (ref 22–29)
CREAT SERPL-MCNC: 0.73 MG/DL (ref 0.55–1.02)
CREAT/UREA NIT SERPL: 21
EOSINOPHIL # BLD AUTO: 0.01 X10(3)/MCL (ref 0–0.9)
EOSINOPHIL NFR BLD AUTO: 0.1 %
ERYTHROCYTE [DISTWIDTH] IN BLOOD BY AUTOMATED COUNT: 12.6 % (ref 11.5–17)
GFR SERPLBLD CREATININE-BSD FMLA CKD-EPI: >60 ML/MIN/1.73/M2
GLOBULIN SER-MCNC: 3.3 GM/DL (ref 2.4–3.5)
GLUCOSE SERPL-MCNC: 101 MG/DL (ref 74–100)
HCT VFR BLD AUTO: 35.8 % (ref 37–47)
HGB BLD-MCNC: 11.8 G/DL (ref 12–16)
IMM GRANULOCYTES # BLD AUTO: 0.02 X10(3)/MCL (ref 0–0.04)
IMM GRANULOCYTES NFR BLD AUTO: 0.3 %
LYMPHOCYTES # BLD AUTO: 1.4 X10(3)/MCL (ref 0.6–4.6)
LYMPHOCYTES NFR BLD AUTO: 20.6 %
MCH RBC QN AUTO: 32.6 PG (ref 27–31)
MCHC RBC AUTO-ENTMCNC: 33 G/DL (ref 33–36)
MCV RBC AUTO: 98.9 FL (ref 80–94)
MONOCYTES # BLD AUTO: 0.53 X10(3)/MCL (ref 0.1–1.3)
MONOCYTES NFR BLD AUTO: 7.8 %
NEUTROPHILS # BLD AUTO: 4.81 X10(3)/MCL (ref 2.1–9.2)
NEUTROPHILS NFR BLD AUTO: 70.9 %
NRBC BLD AUTO-RTO: 0 %
PLATELET # BLD AUTO: 207 X10(3)/MCL (ref 130–400)
PMV BLD AUTO: 11.1 FL (ref 7.4–10.4)
POTASSIUM SERPL-SCNC: 3.8 MMOL/L (ref 3.5–5.1)
PROT SERPL-MCNC: 7.4 GM/DL (ref 6.4–8.3)
RBC # BLD AUTO: 3.62 X10(6)/MCL (ref 4.2–5.4)
SODIUM SERPL-SCNC: 141 MMOL/L (ref 136–145)
WBC # BLD AUTO: 6.79 X10(3)/MCL (ref 4.5–11.5)

## 2024-09-26 PROCEDURE — 85025 COMPLETE CBC W/AUTO DIFF WBC: CPT

## 2024-09-26 PROCEDURE — 36415 COLL VENOUS BLD VENIPUNCTURE: CPT

## 2024-09-26 PROCEDURE — 80053 COMPREHEN METABOLIC PANEL: CPT

## 2024-09-26 RX ORDER — KETOROLAC TROMETHAMINE 10 MG/1
10 TABLET, FILM COATED ORAL ONCE
OUTPATIENT
Start: 2024-09-26 | End: 2024-10-01

## 2024-09-26 RX ORDER — ACETAMINOPHEN 500 MG
1000 TABLET ORAL
OUTPATIENT
Start: 2024-09-26

## 2024-09-26 RX ORDER — SCOLOPAMINE TRANSDERMAL SYSTEM 1 MG/1
1 PATCH, EXTENDED RELEASE TRANSDERMAL ONCE AS NEEDED
OUTPATIENT
Start: 2024-09-26 | End: 2036-02-23

## 2024-09-26 RX ORDER — GABAPENTIN 100 MG/1
300 CAPSULE ORAL
OUTPATIENT
Start: 2024-09-26

## 2024-09-26 RX ORDER — SODIUM CHLORIDE, SODIUM GLUCONATE, SODIUM ACETATE, POTASSIUM CHLORIDE AND MAGNESIUM CHLORIDE 30; 37; 368; 526; 502 MG/100ML; MG/100ML; MG/100ML; MG/100ML; MG/100ML
INJECTION, SOLUTION INTRAVENOUS CONTINUOUS
OUTPATIENT
Start: 2024-09-26

## 2024-09-26 NOTE — DISCHARGE INSTRUCTIONS
keep your splint on.  Keep it clean and dry.  If you develop numbness or weakness or pain loosen the splint and come back to the emergency department right away.  Call the orthopedic surgeon in the morning they will give you a follow up appointment.  If anything changes or worsens please return to the emergency department

## 2024-09-26 NOTE — ED PROVIDER NOTES
Encounter Date: 2024    SCRIBE #1 NOTE: I, Montse Martinez, am scribing for, and in the presence of,  Mervin Hicks MD. I have scribed the following portions of the note - Other sections scribed: HPI, ROS, PE.       History     Chief Complaint   Patient presents with    Wrist Injury     Pt states she broke her L wrist today playing tennis. Arrives with splint in place from urgent care, PMS intact. No xrays, meds given at urgent care     59 year old female with history of Raynaud's syndrome presents to the ED with complaints of left wrist pain. Pt reports that she fell at 1830 today and landed on her left wrist. She went to urgent care and was told that it was broken but they could not do an xray or give pain medication. Pt also complains of numbness in the fingers. She has no other complaints.     The history is provided by the patient. No  was used.     Review of patient's allergies indicates:   Allergen Reactions    Sulfa (sulfonamide antibiotics) Hives and Itching     Past Medical History:   Diagnosis Date    Raynaud's syndrome      Past Surgical History:   Procedure Laterality Date     SECTION      x 2     SECTION  Mar 1997 and May 2000     Family History   Problem Relation Name Age of Onset    Breast cancer Mother Glendy Rodas 65    Heart disease Mother Glendy Rodas     Cancer Mother Glendy Rodas         breast cancer; angiosarcoma    Leukemia Father Frederick Rodas     Hypertension Father Frederick Rodas     Hearing loss Father Frederick Rodas         hard of hearing    Cerebral aneurysm Brother      Breast cancer Paternal Grandmother Aditi Malveaux 75    Cancer Paternal Grandmother Aditi Ramonveaux         breast cancer    Hearing loss Paternal Grandmother Aditi Malveaux         hard of hearing    Colon cancer Paternal Aunt  70    Cancer Paternal Aunt Glenys Cohen         colon cancer    Hearing loss Paternal Aunt Glenys Cohen         hard of hearing    Early death  Brother Holland Rodas         cerebral aneurysm age 35    Early death Brother Lionel Rodas         cardiac arrest age 50    Heart disease Brother Lionel Rodas      Social History     Tobacco Use    Smoking status: Never    Smokeless tobacco: Never   Substance Use Topics    Alcohol use: Yes     Alcohol/week: 12.0 standard drinks of alcohol     Types: 10 Glasses of wine, 2 Drinks containing 0.5 oz of alcohol per week     Comment: Socially (3-4 times a week)    Drug use: Never     Review of Systems   Musculoskeletal:         +left wrist pain.    Neurological:  Positive for numbness (left fingers).       Physical Exam     Initial Vitals [09/25/24 2011]   BP Pulse Resp Temp SpO2   (!) 153/82 82 18 97.8 °F (36.6 °C) 97 %      MAP       --         Physical Exam    Nursing note and vitals reviewed.  Constitutional: She appears well-developed and well-nourished. No distress.   HENT:   Head: Normocephalic and atraumatic.   Eyes: Conjunctivae are normal.   Cardiovascular:  Normal rate and intact distal pulses.           Pulses:       Radial pulses are 2+ on the left side.   Pulmonary/Chest: No respiratory distress. She has no rhonchi.   Abdominal: Abdomen is soft. Bowel sounds are normal. There is no abdominal tenderness. There is no rebound and no guarding.   Musculoskeletal:         General: No edema.      Comments: Deformity to the left wrist. Decreased extension of fingers on left hand. Capillary refill of 3 seconds. Pt has a ring on the left ring finger.      Neurological: She is alert and oriented to person, place, and time. She has normal strength.   Light touch sensation intact to left hand and fingers.    Skin: Skin is warm and dry.   Psychiatric: She has a normal mood and affect.         ED Course   Orthopedic Injury    Date/Time: 9/25/2024 10:11 PM    Performed by: Mervin Hicks MD  Authorized by: Mervin Hicks MD    Location procedure was performed:  [facility] EMERGENCY DEPARTMENT  Injury:     Injury  location:  Wrist    Location details:  Left wrist    Injury type:  Fracture-dislocation      Pre-procedure assessment:     Distal perfusion: normal      Neurological function: normal      Range of motion: reduced      Patient sedated?: Yes      ASA Class:  Class 1 - Heathy patient. No medical history.    Mallampati Score:  Class 2 - Visualization of the soft palate, fauces, and uvula.    Patient/Family history of anesthesia or sedation complications: No      Sedation:  Propofol    Analgesia:  Morphine    Sedation start:  9/25/2024 9:30 PM    Sedation end:  9/25/2024 9:41 PM      Selections made in this section will also lock the Injury type section above.:     Manipulation performed?: Yes      Skin traction used?: Yes      Reduction successful?: Yes      Confirmation: Reduction confirmed by x-ray (Improved)      Immobilization:  Splint and sling  Post-procedure assessment:     Distal perfusion: normal      Neurological function: normal      Range of motion: normal      Patient tolerance:  Patient tolerated the procedure well with no immediate complications     Alignment of the fracture improved with reduction.  Repeat exam able to fully flex and extend the fingers continues to have normal light touch sensation radial ulnar and median distribution 2+ radial pulse.  Splint was applied by me with the assistance of AYAZ Magallon he was to be neurovascularly intact after splint application appropriately fitted.  Sling applied.  Patient's ring was cut off prior to reduction    Labs Reviewed - No data to display       Imaging Results              X-Ray Wrist 2 View Left (In process)  Result time 09/25/24 21:52:30   Procedure changed from X-Ray Wrist Complete Left                    X-Ray Wrist Complete Left (Final result)  Result time 09/25/24 21:32:57      Final result by Dada Arizmendi MD (09/25/24 21:32:57)                   Impression:      Fractures.      Electronically signed by: Dada  Arizmendi  Date:    09/25/2024  Time:    21:32               Narrative:    EXAMINATION:  XR WRIST COMPLETE 3 VIEWS LEFT    CLINICAL HISTORY:  Left wrist.    TECHNIQUE:  Three views.    COMPARISON:  None available.    FINDINGS:  There is impacted displaced and angulated fracture of the distal metaphysis of the radius.  The fracture is extra-articular.  There is also fracture displacement of the ulnar styloid process.                                       Medications   oxyCODONE-acetaminophen  mg per tablet 1 tablet (has no administration in time range)   ketorolac injection 30 mg (30 mg Intramuscular Given 9/25/24 2040)   morphine 4 mg/mL injection (4 mg  Given 9/25/24 2117)   ondansetron 4 mg/2 mL injection (4 mg  Given 9/25/24 2117)   propofol (DIPRIVAN) 10 mg/mL IVP (20 mg Intravenous Given 9/25/24 2138)     Medical Decision Making  Differential diagnosis includes but is not limited to wrist fracture, nerve compression    Amount and/or Complexity of Data Reviewed  Radiology: ordered.    Risk  Prescription drug management.              Attending Attestation:           Physician Attestation for Scribe:  Physician Attestation Statement for Scribe #1: I, Mervin Hicks MD, reviewed documentation, as scribed by Montse Martinez in my presence, and it is both accurate and complete.             ED Course as of 09/25/24 2215   Wed Sep 25, 2024   2153 Discussed case with Dr. Duong.  He reviewed the x-rays.  Patient is intact now with flexion-extension of the fingers light touch sensation radial ulnar and median is intact.  2+ radial pulse.  Patient was okay to go home follow up in clinic elevation ice at home [LF]      ED Course User Index  [LF] Mervin Hicks MD                           Clinical Impression:  Final diagnoses:  [S69.92XA] Left wrist injury  [S62.102A] Left wrist fracture, closed, initial encounter  [S62.102A] Left wrist fracture, closed, initial encounter - post reduction          ED Disposition  Condition    Discharge Stable          ED Prescriptions       Medication Sig Dispense Start Date End Date Auth. Provider    oxyCODONE-acetaminophen (PERCOCET) 7.5-325 mg per tablet Take 1 tablet by mouth every 6 (six) hours as needed for Pain. 20 tablet 9/25/2024 9/30/2024 Mervin Hicks MD          Follow-up Information       Follow up With Specialties Details Why Contact Info    Rickie Pedraza MD Internal Medicine Schedule an appointment as soon as possible for a visit   [address] [place] [address] [address]  [facility] [address]  [phone]      Frederick Duong MD Orthopedic Surgery Call   [address] [facility]  [address]  [facility]  [facility] [address]  [phone]               Mervin Hicks MD  09/25/24 2656

## 2024-09-26 NOTE — FIRST PROVIDER EVALUATION
"Medical screening examination initiated.  I have conducted a focused provider triage encounter, findings are as follows:    Brief history of present illness:  59 year old female presents to the ER for evaluation of left wrist injury. Patient reports that she fell onto her left wrist PTA while playing tennis. She reports she went to , however, they were unable to get XR imaging 2/2 pain.     Vitals:    09/25/24 2011   BP: (!) 153/82   Pulse: 82   Resp: 18   Temp: 97.8 °F (36.6 °C)   TempSrc: Oral   SpO2: 97%   Weight: 62.6 kg (138 lb)   Height: 5' 2" (1.575 m)       Pertinent physical exam:  alert, non-labored, left wrist in sugar tong splint     Brief workup plan:  xr imaging     Preliminary workup initiated; this workup will be continued and followed by the physician or advanced practice provider that is assigned to the patient when roomed.  "

## 2024-09-26 NOTE — H&P (VIEW-ONLY)
[facility]  Orthopedic Clinic Note      Chief Complaint:   Chief Complaint   Patient presents with    Left Wrist - Injury     left wrist fx, DOI-24, XR done in ER- prescribed Percocet with some relief, when she was playing tennis- she fell backwards and fell on arm, in a sling and wrapped     Referring Physician: No ref. provider found      History of Present Illness:    This is a 59 y.o. year old female that presents today with a left distal radius fracture that she sustained yesterday while playing tennis.  She went to the emergency room and a placed her in his sugar-tong splint.  She states her pain is moderate.      Past Medical History:   Diagnosis Date    Raynaud's syndrome        Past Surgical History:   Procedure Laterality Date     SECTION      x 2     SECTION  Mar 1997 and May 2000       Current Outpatient Medications   Medication Sig    folic acid 0.8 mg Cap Take 1 mg by mouth once daily.    ibuprofen (ADVIL,MOTRIN) 200 MG tablet Take 200 mg by mouth as needed for Pain.    oxyCODONE-acetaminophen (PERCOCET) 7.5-325 mg per tablet Take 1 tablet by mouth every 6 (six) hours as needed for Pain.     No current facility-administered medications for this visit.       Review of patient's allergies indicates:   Allergen Reactions    Sulfa (sulfonamide antibiotics) Hives and Itching       Family History   Problem Relation Name Age of Onset    Breast cancer Mother Glendy Rodas 65    Heart disease Mother Gelndy Rodas     Cancer Mother Glendy Rodas         breast cancer; angiosarcoma    Leukemia Father Frederick Rodas     Hypertension Father Frederick Rodas     Hearing loss Father Frederick Rodas         hard of hearing    Cerebral aneurysm Brother      Breast cancer Paternal Grandmother Jayantthomas Tristenbernardlisa 75    Cancer Paternal Grandmother Aditi Medranolisa         breast cancer    Hearing loss Paternal Grandmother Aditi Medranolisa         hard of hearing    Colon cancer Paternal Aunt  70  "   Cancer Paternal Aunt Glenys Cohen         colon cancer    Hearing loss Paternal Aunt Glenys Cohen         hard of hearing    Early death Brother Holland Rodas         cerebral aneurysm age 35    Early death Brother Lionel Rodas         cardiac arrest age 50    Heart disease Brother Lionel Rodas        Social History     Socioeconomic History    Marital status:    Tobacco Use    Smoking status: Never    Smokeless tobacco: Never   Substance and Sexual Activity    Alcohol use: Yes     Alcohol/week: 12.0 standard drinks of alcohol     Types: 10 Glasses of wine, 2 Drinks containing 0.5 oz of alcohol per week     Comment: Socially (3-4 times a week)    Drug use: Never    Sexual activity: Yes     Partners: Male     Birth control/protection: None     Social Determinants of Health     Financial Resource Strain: Low Risk  (7/1/2024)    Overall Financial Resource Strain (CARDIA)     Difficulty of Paying Living Expenses: Not very hard   Food Insecurity: No Food Insecurity (7/1/2024)    Hunger Vital Sign     Worried About Running Out of Food in the Last Year: Never true     Ran Out of Food in the Last Year: Never true   Physical Activity: Sufficiently Active (7/1/2024)    Exercise Vital Sign     Days of Exercise per Week: 3 days     Minutes of Exercise per Session: 150+ min   Stress: Stress Concern Present (7/1/2024)    [employer] [place] [employer] - Occupational Stress Questionnaire     Feeling of Stress : To some extent   Housing Stability: Unknown (7/1/2024)    Housing Stability Vital Sign     Unable to Pay for Housing in the Last Year: No         Review of Systems:    All review of systems negative except for those stated in the HPI    Examination:    Vital Signs:    Vitals:    09/26/24 1310   BP: (!) 143/80   Pulse: (!) 58   Weight: 62.6 kg (138 lb 0.1 oz)   Height: 5' 2" (1.575 m)       Body mass index is 25.24 kg/m².    Physical Exam:   General: Well-developed, well-nourished.  Neuro: Alert and " oriented x 3.  Psych: Normal mood and affect.  Card: Regular rate and rhythm  Resp: Respirations regular and unlabored  Left upper extremity:  Extensive exam unable to be done due to splint intact, capillary refill appropriate and can move fingers.    Imaging: X-rays ordered and images interpreted today personally by me of three views of the left wrist from an outside facility demonstrate a distal radius fracture and ulnar styloid fracture.         Assessment: Closed Colles' fracture of left radius, initial encounter    Nondisplaced fracture of left ulna styloid process, initial encounter for closed fracture        Plan:  After reviewing the x-rays with the patient, we have decided to proceed with surgical intervention.  The goal be to do an open reduction internal fixation of the left distal radius fracture.  We will treat the ulnar styloid fracture closed.  The surgical procedure was explained to the patient in detail.  The patient acknowledges that they understood the risks, benefits, and other alternatives.  Patient signed an informed consent. Patient acknowledges their understanding and agreement with the plan.                No follow-ups on file.    DISCLAIMER: This note may have been dictated using voice recognition software and may contain grammatical errors.     NOTE: Consult report sent to referring provider via [employer].

## 2024-09-26 NOTE — PROGRESS NOTES
[facility]  Orthopedic Clinic Note      Chief Complaint:   Chief Complaint   Patient presents with    Left Wrist - Injury     left wrist fx, DOI-24, XR done in ER- prescribed Percocet with some relief, when she was playing tennis- she fell backwards and fell on arm, in a sling and wrapped     Referring Physician: No ref. provider found      History of Present Illness:    This is a 59 y.o. year old female that presents today with a left distal radius fracture that she sustained yesterday while playing tennis.  She went to the emergency room and a placed her in his sugar-tong splint.  She states her pain is moderate.      Past Medical History:   Diagnosis Date    Raynaud's syndrome        Past Surgical History:   Procedure Laterality Date     SECTION      x 2     SECTION  Mar 1997 and May 2000       Current Outpatient Medications   Medication Sig    folic acid 0.8 mg Cap Take 1 mg by mouth once daily.    ibuprofen (ADVIL,MOTRIN) 200 MG tablet Take 200 mg by mouth as needed for Pain.    oxyCODONE-acetaminophen (PERCOCET) 7.5-325 mg per tablet Take 1 tablet by mouth every 6 (six) hours as needed for Pain.     No current facility-administered medications for this visit.       Review of patient's allergies indicates:   Allergen Reactions    Sulfa (sulfonamide antibiotics) Hives and Itching       Family History   Problem Relation Name Age of Onset    Breast cancer Mother Glendy Rodas 65    Heart disease Mother Glendy Rodas     Cancer Mother Glendy Rodas         breast cancer; angiosarcoma    Leukemia Father Frederick Rodas     Hypertension Father Frederick Rodas     Hearing loss Father Frederick Rodas         hard of hearing    Cerebral aneurysm Brother      Breast cancer Paternal Grandmother Jayantthomas Tristenbernardlisa 75    Cancer Paternal Grandmother Aditi Medranolisa         breast cancer    Hearing loss Paternal Grandmother Aditi Medranolisa         hard of hearing    Colon cancer Paternal Aunt  70  "   Cancer Paternal Aunt Glenys Cohen         colon cancer    Hearing loss Paternal Aunt Glenys Cohen         hard of hearing    Early death Brother Holland Rodas         cerebral aneurysm age 35    Early death Brother Lionel Rodas         cardiac arrest age 50    Heart disease Brother Lionel Rodas        Social History     Socioeconomic History    Marital status:    Tobacco Use    Smoking status: Never    Smokeless tobacco: Never   Substance and Sexual Activity    Alcohol use: Yes     Alcohol/week: 12.0 standard drinks of alcohol     Types: 10 Glasses of wine, 2 Drinks containing 0.5 oz of alcohol per week     Comment: Socially (3-4 times a week)    Drug use: Never    Sexual activity: Yes     Partners: Male     Birth control/protection: None     Social Determinants of Health     Financial Resource Strain: Low Risk  (7/1/2024)    Overall Financial Resource Strain (CARDIA)     Difficulty of Paying Living Expenses: Not very hard   Food Insecurity: No Food Insecurity (7/1/2024)    Hunger Vital Sign     Worried About Running Out of Food in the Last Year: Never true     Ran Out of Food in the Last Year: Never true   Physical Activity: Sufficiently Active (7/1/2024)    Exercise Vital Sign     Days of Exercise per Week: 3 days     Minutes of Exercise per Session: 150+ min   Stress: Stress Concern Present (7/1/2024)    [employer] - Occupational Stress Questionnaire     Feeling of Stress : To some extent   Housing Stability: Unknown (7/1/2024)    Housing Stability Vital Sign     Unable to Pay for Housing in the Last Year: No         Review of Systems:    All review of systems negative except for those stated in the HPI    Examination:    Vital Signs:    Vitals:    09/26/24 1310   BP: (!) 143/80   Pulse: (!) 58   Weight: 62.6 kg (138 lb 0.1 oz)   Height: 5' 2" (1.575 m)       Body mass index is 25.24 kg/m².    Physical Exam:   General: Well-developed, well-nourished.  Neuro: Alert and " oriented x 3.  Psych: Normal mood and affect.  Card: Regular rate and rhythm  Resp: Respirations regular and unlabored  Left upper extremity:  Extensive exam unable to be done due to splint intact, capillary refill appropriate and can move fingers.    Imaging: X-rays ordered and images interpreted today personally by me of three views of the left wrist from an outside facility demonstrate a distal radius fracture and ulnar styloid fracture.         Assessment: Closed Colles' fracture of left radius, initial encounter    Nondisplaced fracture of left ulna styloid process, initial encounter for closed fracture        Plan:  After reviewing the x-rays with the patient, we have decided to proceed with surgical intervention.  The goal be to do an open reduction internal fixation of the left distal radius fracture.  We will treat the ulnar styloid fracture closed.  The surgical procedure was explained to the patient in detail.  The patient acknowledges that they understood the risks, benefits, and other alternatives.  Patient signed an informed consent. Patient acknowledges their understanding and agreement with the plan.                No follow-ups on file.    DISCLAIMER: This note may have been dictated using voice recognition software and may contain grammatical errors.     NOTE: Consult report sent to referring provider via [employer].

## 2024-09-30 ENCOUNTER — ANESTHESIA (OUTPATIENT)
Dept: SURGERY | Facility: HOSPITAL | Age: 59
End: 2024-09-30
Payer: COMMERCIAL

## 2024-09-30 ENCOUNTER — HOSPITAL ENCOUNTER (OUTPATIENT)
Facility: HOSPITAL | Age: 59
Discharge: HOME | End: 2024-09-30
Attending: ORTHOPAEDIC SURGERY | Admitting: ORTHOPAEDIC SURGERY
Payer: COMMERCIAL

## 2024-09-30 DIAGNOSIS — S52.532A CLOSED COLLES' FRACTURE OF LEFT RADIUS, INITIAL ENCOUNTER: ICD-10-CM

## 2024-09-30 DIAGNOSIS — S52.615A NONDISPLACED FRACTURE OF LEFT ULNA STYLOID PROCESS, INITIAL ENCOUNTER FOR CLOSED FRACTURE: ICD-10-CM

## 2024-09-30 DIAGNOSIS — M19.90 OSTEOARTHRITIS: ICD-10-CM

## 2024-09-30 PROCEDURE — 25000003 PHARM REV CODE 250: Performed by: ORTHOPAEDIC SURGERY

## 2024-09-30 PROCEDURE — C1713 ANCHOR/SCREW BN/BN,TIS/BN: HCPCS | Performed by: ORTHOPAEDIC SURGERY

## 2024-09-30 PROCEDURE — 37000008 HC ANESTHESIA 1ST 15 MINUTES: Performed by: ORTHOPAEDIC SURGERY

## 2024-09-30 PROCEDURE — 64417 NJX AA&/STRD AX NERVE IMG: CPT | Mod: 59,LT,, | Performed by: ANESTHESIOLOGY

## 2024-09-30 PROCEDURE — 25607 OPTX DST RD XARTC FX/EPI SEP: CPT | Mod: LT,,, | Performed by: ORTHOPAEDIC SURGERY

## 2024-09-30 PROCEDURE — D9220A PRA ANESTHESIA: Mod: CRNA,,,

## 2024-09-30 PROCEDURE — 64415 NJX AA&/STRD BRCH PLXS IMG: CPT | Performed by: ANESTHESIOLOGY

## 2024-09-30 PROCEDURE — 63600175 PHARM REV CODE 636 W HCPCS

## 2024-09-30 PROCEDURE — 71000015 HC POSTOP RECOV 1ST HR: Performed by: ORTHOPAEDIC SURGERY

## 2024-09-30 PROCEDURE — 71000033 HC RECOVERY, INTIAL HOUR: Performed by: ORTHOPAEDIC SURGERY

## 2024-09-30 PROCEDURE — 63600175 PHARM REV CODE 636 W HCPCS: Performed by: ORTHOPAEDIC SURGERY

## 2024-09-30 PROCEDURE — 27201423 OPTIME MED/SURG SUP & DEVICES STERILE SUPPLY: Performed by: ORTHOPAEDIC SURGERY

## 2024-09-30 PROCEDURE — 71000016 HC POSTOP RECOV ADDL HR: Performed by: ORTHOPAEDIC SURGERY

## 2024-09-30 PROCEDURE — 25607 OPTX DST RD XARTC FX/EPI SEP: CPT | Mod: AS,LT,,

## 2024-09-30 PROCEDURE — 25000003 PHARM REV CODE 250

## 2024-09-30 PROCEDURE — 37000009 HC ANESTHESIA EA ADD 15 MINS: Performed by: ORTHOPAEDIC SURGERY

## 2024-09-30 PROCEDURE — 25000003 PHARM REV CODE 250: Performed by: ANESTHESIOLOGY

## 2024-09-30 PROCEDURE — 36000711: Performed by: ORTHOPAEDIC SURGERY

## 2024-09-30 PROCEDURE — 36000710: Performed by: ORTHOPAEDIC SURGERY

## 2024-09-30 PROCEDURE — C1769 GUIDE WIRE: HCPCS | Performed by: ORTHOPAEDIC SURGERY

## 2024-09-30 PROCEDURE — D9220A PRA ANESTHESIA: Mod: ANES,,, | Performed by: ANESTHESIOLOGY

## 2024-09-30 DEVICE — SCREW BONE CORTICAL 2.7X16MM: Type: IMPLANTABLE DEVICE | Site: RADIUS | Status: FUNCTIONAL

## 2024-09-30 DEVICE — SCREW SQUARE LOK TI 2.7X18MM: Type: IMPLANTABLE DEVICE | Site: RADIUS | Status: FUNCTIONAL

## 2024-09-30 DEVICE — IMPLANTABLE DEVICE: Type: IMPLANTABLE DEVICE | Site: RADIUS | Status: FUNCTIONAL

## 2024-09-30 DEVICE — SCREW SQUARE LOK TI 2.7X20MM: Type: IMPLANTABLE DEVICE | Site: RADIUS | Status: FUNCTIONAL

## 2024-09-30 RX ORDER — PROPOFOL 10 MG/ML
VIAL (ML) INTRAVENOUS
Status: DISCONTINUED | OUTPATIENT
Start: 2024-09-30 | End: 2024-09-30

## 2024-09-30 RX ORDER — DEXAMETHASONE SODIUM PHOSPHATE 4 MG/ML
INJECTION, SOLUTION INTRA-ARTICULAR; INTRALESIONAL; INTRAMUSCULAR; INTRAVENOUS; SOFT TISSUE
Status: DISCONTINUED | OUTPATIENT
Start: 2024-09-30 | End: 2024-09-30

## 2024-09-30 RX ORDER — SODIUM CHLORIDE, SODIUM GLUCONATE, SODIUM ACETATE, POTASSIUM CHLORIDE AND MAGNESIUM CHLORIDE 30; 37; 368; 526; 502 MG/100ML; MG/100ML; MG/100ML; MG/100ML; MG/100ML
INJECTION, SOLUTION INTRAVENOUS CONTINUOUS
Status: DISCONTINUED | OUTPATIENT
Start: 2024-09-30 | End: 2024-09-30 | Stop reason: HOSPADM

## 2024-09-30 RX ORDER — HYDROCODONE BITARTRATE AND ACETAMINOPHEN 5; 325 MG/1; MG/1
1 TABLET ORAL EVERY 4 HOURS PRN
Status: DISCONTINUED | OUTPATIENT
Start: 2024-09-30 | End: 2024-09-30 | Stop reason: HOSPADM

## 2024-09-30 RX ORDER — MIDAZOLAM HYDROCHLORIDE 2 MG/2ML
2 INJECTION, SOLUTION INTRAMUSCULAR; INTRAVENOUS ONCE AS NEEDED
Status: COMPLETED | OUTPATIENT
Start: 2024-09-30 | End: 2024-09-30

## 2024-09-30 RX ORDER — GABAPENTIN 300 MG/1
300 CAPSULE ORAL EVERY 8 HOURS
Qty: 15 CAPSULE | Refills: 0 | Status: SHIPPED | OUTPATIENT
Start: 2024-09-30 | End: 2024-10-05

## 2024-09-30 RX ORDER — PHENYLEPHRINE HCL IN 0.9% NACL 1 MG/10 ML
SYRINGE (ML) INTRAVENOUS
Status: DISCONTINUED | OUTPATIENT
Start: 2024-09-30 | End: 2024-09-30

## 2024-09-30 RX ORDER — ONDANSETRON HYDROCHLORIDE 2 MG/ML
4 INJECTION, SOLUTION INTRAVENOUS DAILY PRN
Status: DISCONTINUED | OUTPATIENT
Start: 2024-09-30 | End: 2024-09-30 | Stop reason: HOSPADM

## 2024-09-30 RX ORDER — ACETAMINOPHEN 500 MG
1000 TABLET ORAL
Status: COMPLETED | OUTPATIENT
Start: 2024-09-30 | End: 2024-09-30

## 2024-09-30 RX ORDER — SODIUM CHLORIDE 9 MG/ML
INJECTION, SOLUTION INTRAVENOUS CONTINUOUS
Status: DISCONTINUED | OUTPATIENT
Start: 2024-09-30 | End: 2024-09-30 | Stop reason: HOSPADM

## 2024-09-30 RX ORDER — MIDAZOLAM HYDROCHLORIDE 2 MG/2ML
INJECTION, SOLUTION INTRAMUSCULAR; INTRAVENOUS
Status: COMPLETED
Start: 2024-09-30 | End: 2024-09-30

## 2024-09-30 RX ORDER — HYDROMORPHONE HYDROCHLORIDE 2 MG/ML
0.2 INJECTION, SOLUTION INTRAMUSCULAR; INTRAVENOUS; SUBCUTANEOUS EVERY 5 MIN PRN
Status: DISCONTINUED | OUTPATIENT
Start: 2024-09-30 | End: 2024-09-30 | Stop reason: HOSPADM

## 2024-09-30 RX ORDER — KETOROLAC TROMETHAMINE 10 MG/1
10 TABLET, FILM COATED ORAL ONCE
Status: COMPLETED | OUTPATIENT
Start: 2024-09-30 | End: 2024-09-30

## 2024-09-30 RX ORDER — MORPHINE SULFATE 4 MG/ML
3 INJECTION, SOLUTION INTRAMUSCULAR; INTRAVENOUS
Status: DISCONTINUED | OUTPATIENT
Start: 2024-09-30 | End: 2024-09-30 | Stop reason: HOSPADM

## 2024-09-30 RX ORDER — ONDANSETRON HYDROCHLORIDE 2 MG/ML
INJECTION, SOLUTION INTRAVENOUS
Status: DISCONTINUED | OUTPATIENT
Start: 2024-09-30 | End: 2024-09-30

## 2024-09-30 RX ORDER — ONDANSETRON HYDROCHLORIDE 2 MG/ML
4 INJECTION, SOLUTION INTRAVENOUS EVERY 6 HOURS PRN
Status: DISCONTINUED | OUTPATIENT
Start: 2024-09-30 | End: 2024-09-30

## 2024-09-30 RX ORDER — ONDANSETRON HYDROCHLORIDE 2 MG/ML
4 INJECTION, SOLUTION INTRAVENOUS EVERY 6 HOURS PRN
Status: DISCONTINUED | OUTPATIENT
Start: 2024-09-30 | End: 2024-09-30 | Stop reason: HOSPADM

## 2024-09-30 RX ORDER — ROPIVACAINE HYDROCHLORIDE 5 MG/ML
INJECTION, SOLUTION EPIDURAL; INFILTRATION; PERINEURAL
Status: DISCONTINUED
Start: 2024-09-30 | End: 2024-09-30 | Stop reason: HOSPADM

## 2024-09-30 RX ORDER — LIDOCAINE HYDROCHLORIDE 10 MG/ML
1 INJECTION, SOLUTION EPIDURAL; INFILTRATION; INTRACAUDAL; PERINEURAL ONCE
Status: DISCONTINUED | OUTPATIENT
Start: 2024-09-30 | End: 2024-09-30 | Stop reason: HOSPADM

## 2024-09-30 RX ORDER — GABAPENTIN 300 MG/1
300 CAPSULE ORAL
Status: COMPLETED | OUTPATIENT
Start: 2024-09-30 | End: 2024-09-30

## 2024-09-30 RX ORDER — FENTANYL CITRATE 50 UG/ML
INJECTION, SOLUTION INTRAMUSCULAR; INTRAVENOUS
Status: DISCONTINUED | OUTPATIENT
Start: 2024-09-30 | End: 2024-09-30

## 2024-09-30 RX ORDER — ONDANSETRON 4 MG/1
4 TABLET, ORALLY DISINTEGRATING ORAL EVERY 6 HOURS PRN
Qty: 30 TABLET | Refills: 0 | Status: SHIPPED | OUTPATIENT
Start: 2024-09-30 | End: 2024-10-10

## 2024-09-30 RX ORDER — LIDOCAINE HYDROCHLORIDE 20 MG/ML
INJECTION INTRAVENOUS
Status: DISCONTINUED | OUTPATIENT
Start: 2024-09-30 | End: 2024-09-30

## 2024-09-30 RX ORDER — MELOXICAM 7.5 MG/1
TABLET ORAL
Qty: 30 TABLET | Refills: 0 | Status: SHIPPED | OUTPATIENT
Start: 2024-10-06 | End: 2024-10-15

## 2024-09-30 RX ORDER — METHOCARBAMOL 500 MG/1
1000 TABLET, FILM COATED ORAL EVERY 6 HOURS PRN
Status: DISCONTINUED | OUTPATIENT
Start: 2024-09-30 | End: 2024-09-30 | Stop reason: HOSPADM

## 2024-09-30 RX ORDER — ROPIVACAINE HYDROCHLORIDE 5 MG/ML
30 INJECTION, SOLUTION EPIDURAL; INFILTRATION; PERINEURAL ONCE
OUTPATIENT
Start: 2024-09-30 | End: 2024-09-30

## 2024-09-30 RX ORDER — METOCLOPRAMIDE HYDROCHLORIDE 5 MG/ML
10 INJECTION INTRAMUSCULAR; INTRAVENOUS EVERY 10 MIN PRN
Status: DISCONTINUED | OUTPATIENT
Start: 2024-09-30 | End: 2024-09-30 | Stop reason: HOSPADM

## 2024-09-30 RX ORDER — METOCLOPRAMIDE HYDROCHLORIDE 5 MG/ML
10 INJECTION INTRAMUSCULAR; INTRAVENOUS EVERY 6 HOURS PRN
Status: DISCONTINUED | OUTPATIENT
Start: 2024-09-30 | End: 2024-09-30 | Stop reason: HOSPADM

## 2024-09-30 RX ORDER — ONDANSETRON 4 MG/1
4 TABLET, ORALLY DISINTEGRATING ORAL ONCE
Status: COMPLETED | OUTPATIENT
Start: 2024-09-30 | End: 2024-09-30

## 2024-09-30 RX ORDER — KETOROLAC TROMETHAMINE 10 MG/1
10 TABLET, FILM COATED ORAL EVERY 6 HOURS
Qty: 20 TABLET | Refills: 0 | Status: SHIPPED | OUTPATIENT
Start: 2024-09-30 | End: 2024-10-05

## 2024-09-30 RX ORDER — SCOLOPAMINE TRANSDERMAL SYSTEM 1 MG/1
1 PATCH, EXTENDED RELEASE TRANSDERMAL ONCE AS NEEDED
Status: DISCONTINUED | OUTPATIENT
Start: 2024-09-30 | End: 2024-09-30 | Stop reason: HOSPADM

## 2024-09-30 RX ORDER — ALUMINUM HYDROXIDE, MAGNESIUM HYDROXIDE, AND SIMETHICONE 1200; 120; 1200 MG/30ML; MG/30ML; MG/30ML
30 SUSPENSION ORAL EVERY 6 HOURS PRN
Status: DISCONTINUED | OUTPATIENT
Start: 2024-09-30 | End: 2024-09-30 | Stop reason: HOSPADM

## 2024-09-30 RX ORDER — OXYCODONE HYDROCHLORIDE 5 MG/1
5 TABLET ORAL EVERY 12 HOURS PRN
Qty: 14 TABLET | Refills: 0 | Status: SHIPPED | OUTPATIENT
Start: 2024-09-30 | End: 2024-10-08

## 2024-09-30 RX ORDER — OXYCODONE HYDROCHLORIDE 5 MG/1
5 TABLET ORAL EVERY 12 HOURS PRN
Qty: 14 TABLET | Refills: 0 | Status: SHIPPED | OUTPATIENT
Start: 2024-09-30 | End: 2024-10-07

## 2024-09-30 RX ORDER — MUPIROCIN 20 MG/G
OINTMENT TOPICAL 2 TIMES DAILY
Status: DISCONTINUED | OUTPATIENT
Start: 2024-09-30 | End: 2024-09-30 | Stop reason: HOSPADM

## 2024-09-30 RX ORDER — METHOCARBAMOL 500 MG/1
1000 TABLET, FILM COATED ORAL EVERY 6 HOURS PRN
Status: DISCONTINUED | OUTPATIENT
Start: 2024-09-30 | End: 2024-09-30

## 2024-09-30 RX ORDER — GLYCOPYRROLATE 0.2 MG/ML
INJECTION INTRAMUSCULAR; INTRAVENOUS
Status: DISCONTINUED | OUTPATIENT
Start: 2024-09-30 | End: 2024-09-30

## 2024-09-30 RX ORDER — METHOCARBAMOL 750 MG/1
750 TABLET, FILM COATED ORAL EVERY 6 HOURS
Qty: 56 TABLET | Refills: 0 | Status: SHIPPED | OUTPATIENT
Start: 2024-09-30 | End: 2024-10-14

## 2024-09-30 RX ORDER — DIPHENHYDRAMINE HYDROCHLORIDE 50 MG/ML
25 INJECTION INTRAMUSCULAR; INTRAVENOUS EVERY 6 HOURS PRN
Status: DISCONTINUED | OUTPATIENT
Start: 2024-09-30 | End: 2024-09-30 | Stop reason: HOSPADM

## 2024-09-30 RX ORDER — ALUMINUM HYDROXIDE, MAGNESIUM HYDROXIDE, AND SIMETHICONE 1200; 120; 1200 MG/30ML; MG/30ML; MG/30ML
30 SUSPENSION ORAL EVERY 6 HOURS PRN
Status: DISCONTINUED | OUTPATIENT
Start: 2024-09-30 | End: 2024-09-30

## 2024-09-30 RX ORDER — METOCLOPRAMIDE HYDROCHLORIDE 5 MG/ML
10 INJECTION INTRAMUSCULAR; INTRAVENOUS EVERY 6 HOURS PRN
Status: DISCONTINUED | OUTPATIENT
Start: 2024-09-30 | End: 2024-09-30

## 2024-09-30 RX ADMIN — Medication 100 MCG: at 09:09

## 2024-09-30 RX ADMIN — DEXAMETHASONE SODIUM PHOSPHATE 8 MG: 4 INJECTION, SOLUTION INTRA-ARTICULAR; INTRALESIONAL; INTRAMUSCULAR; INTRAVENOUS; SOFT TISSUE at 08:09

## 2024-09-30 RX ADMIN — SODIUM CHLORIDE, SODIUM GLUCONATE, SODIUM ACETATE, POTASSIUM CHLORIDE AND MAGNESIUM CHLORIDE: 526; 502; 368; 37; 30 INJECTION, SOLUTION INTRAVENOUS at 08:09

## 2024-09-30 RX ADMIN — LIDOCAINE HYDROCHLORIDE 50 MG: 20 INJECTION INTRAVENOUS at 08:09

## 2024-09-30 RX ADMIN — SODIUM CHLORIDE, SODIUM GLUCONATE, SODIUM ACETATE, POTASSIUM CHLORIDE AND MAGNESIUM CHLORIDE: 526; 502; 368; 37; 30 INJECTION, SOLUTION INTRAVENOUS at 07:09

## 2024-09-30 RX ADMIN — ONDANSETRON 4 MG: 4 TABLET, ORALLY DISINTEGRATING ORAL at 07:09

## 2024-09-30 RX ADMIN — Medication 50 MCG: at 09:09

## 2024-09-30 RX ADMIN — GABAPENTIN 300 MG: 300 CAPSULE ORAL at 07:09

## 2024-09-30 RX ADMIN — ONDANSETRON HYDROCHLORIDE 4 MG: 2 SOLUTION INTRAMUSCULAR; INTRAVENOUS at 09:09

## 2024-09-30 RX ADMIN — MIDAZOLAM HYDROCHLORIDE 2 MG: 1 INJECTION, SOLUTION INTRAMUSCULAR; INTRAVENOUS at 08:09

## 2024-09-30 RX ADMIN — MIDAZOLAM HYDROCHLORIDE 2 MG: 2 INJECTION, SOLUTION INTRAMUSCULAR; INTRAVENOUS at 08:09

## 2024-09-30 RX ADMIN — KETOROLAC TROMETHAMINE 10 MG: 10 TABLET, FILM COATED ORAL at 07:09

## 2024-09-30 RX ADMIN — PROPOFOL 150 MG: 10 INJECTION, EMULSION INTRAVENOUS at 08:09

## 2024-09-30 RX ADMIN — CEFAZOLIN 2 G: 2 INJECTION, POWDER, FOR SOLUTION INTRAMUSCULAR; INTRAVENOUS at 08:09

## 2024-09-30 RX ADMIN — FENTANYL CITRATE 50 MCG: 50 INJECTION, SOLUTION INTRAMUSCULAR; INTRAVENOUS at 08:09

## 2024-09-30 RX ADMIN — GLYCOPYRROLATE 0.2 MG: 0.2 INJECTION INTRAMUSCULAR; INTRAVENOUS at 08:09

## 2024-09-30 RX ADMIN — FENTANYL CITRATE 25 MCG: 50 INJECTION, SOLUTION INTRAMUSCULAR; INTRAVENOUS at 09:09

## 2024-09-30 RX ADMIN — ACETAMINOPHEN 1000 MG: 500 TABLET ORAL at 07:09

## 2024-09-30 NOTE — ANESTHESIA PROCEDURE NOTES
Intubation    Date/Time: 9/30/2024 8:44 AM    Performed by: Edmund Lazar CRNA  Authorized by: Dawood Slaughter MD    Intubation:     Induction:  Intravenous    Intubated:  Postinduction    Mask Ventilation:  Easy mask    Attempts:  1    Attempted By:  CRNA    Difficult Airway Encountered?: No      Complications:  None    Airway Device:  Supraglottic airway/LMA    Airway Device Size:  4.0    Style/Cuff Inflation:  Uncuffed    Secured at:  The lips    Placement Verified By:  Capnometry    Complicating Factors:  None    Findings Post-Intubation:  BS equal bilateral and atraumatic/condition of teeth unchanged       show

## 2024-09-30 NOTE — TRANSFER OF CARE
"Anesthesia Transfer of Care Note    Patient: Claudette M Malveaux [place]    Procedure(s) Performed: Procedure(s) (LRB):  ORIF, FRACTURE, RADIUS - Biomet (Left)    Patient location: PACU    Anesthesia Type: general    Transport from OR: Transported from OR on room air with adequate spontaneous ventilation    Post pain: adequate analgesia    Post assessment: no apparent anesthetic complications    Post vital signs: stable    Level of consciousness: sedated and responds to stimulation    Nausea/Vomiting: no nausea/vomiting    Complications: none    Transfer of care protocol was followed      Last vitals: Visit Vitals  /73   Pulse 89   Temp 36 °C (96.8 °F)   Resp 16   Ht 5' 2" (1.575 m)   Wt 64.9 kg (143 lb 1.3 oz)   SpO2 99%   Breastfeeding No   BMI 26.17 kg/m²     "

## 2024-09-30 NOTE — ANESTHESIA POSTPROCEDURE EVALUATION
Anesthesia Post Evaluation    Patient: Claudette M Malveaux Washington    Procedure(s) Performed: Procedure(s) (LRB):  ORIF, FRACTURE, RADIUS - Biomet (Left)    Final Anesthesia Type: general      Patient location during evaluation: PACU  Patient participation: Yes- Able to Participate  Level of consciousness: awake and alert and oriented  Post-procedure vital signs: reviewed and stable  Pain management: adequate  Airway patency: patent  KASSY mitigation strategies: Multimodal analgesia  PONV status at discharge: No PONV  Anesthetic complications: no      Cardiovascular status: blood pressure returned to baseline, hemodynamically stable and stable  Respiratory status: unassisted  Hydration status: euvolemic  Follow-up not needed.  Comments: PostOp pain well managed with Nerve Block (Suprclavic., Axillary, Femoral, etc.)              Vitals Value Taken Time   /73 09/30/24 1024   Temp 36 °C (96.8 °F) 09/30/24 1011   Pulse 85 09/30/24 1026   Resp 19 09/30/24 1026   SpO2 98 % 09/30/24 1026   Vitals shown include unfiled device data.      No case tracking events are documented in the log.      Pain/Ramses Score: Pain Rating Prior to Med Admin: 6 (9/30/2024  7:17 AM)  Ramses Score: 10 (9/30/2024 10:19 AM)

## 2024-09-30 NOTE — ANESTHESIA PREPROCEDURE EVALUATION
"                                                                                                             2024  Claudette M Malveaux Washington is a 59 y.o., female.  Diagnosis:      Closed Colles' fracture of left radius, initial encounter [S52.532A]      Nondisplaced fracture of left ulna styloid process, initial encounter for closed fracture [S52.615A]   Pre-op diagnosis:      Closed Colles' fracture of left radius, initial encounter [S52.532A]      Nondisplaced fracture of left ulna styloid process, initial encounter for closed fracture [S52.615A]       The pt. comes to [facility] for the noted procedure under GA/LMA vs GETA and +/- Regional blk  Case: [id number] Date/Time: 24   Procedure: ORIF, FRACTURE, RADIUS - Biomet (Left: Arm Lower)   Anesthesia type: General/Regional     PMHx:  No specialty history recorded    Other Medical History   Raynaud's syndrome      Problem List  Current as of 24 0500  Elevated blood pressure reading Family history of breast cancer in mother   Pap smear for cervical cancer screening Wellness examination       PSHx:  Surgical History:   SECTION  SECTION         Vital signs:    Most Recent Value 2024  1310      Height: 5' 2" (157.5 cm)  as of 2024   --   Last Wt: 62.6 kg (138 lb 0.1 oz)  as of 2024   --   Body Mass Index: 25.24 kg/m² Abnormal   5' 2" (157.5 cm)  as of 2024  62.6 kg (138 lb 0.1 oz)  as of 2024      Pulse: 58 Abnormal   as of 2024   64   BP: 143/80 Abnormal   as of 2024   161/100 Abnormal    Temp: 36.4 °C (97.6 °F)  as of 2024   --   SpO2: 100%  as of 2024   100 %   Dosing Weight: None            Lab Data:      EKG:        Pre-op Assessment    I have reviewed the Patient Summary Reports.     I have reviewed the Nursing Notes. I have reviewed the NPO Status.   I have reviewed the Medications.     Review of Systems  Anesthesia Hx:  No problems with previous Anesthesia              "   Social:  Non-Smoker       Hematology/Oncology:  Hematology Normal   Oncology Normal                                   EENT/Dental:  EENT/Dental Normal           Cardiovascular:  Cardiovascular Normal Exercise tolerance: good                   Functional Capacity good / => 4 METS                         Pulmonary:  Pulmonary Normal                       Renal/:  Renal/ Normal                 Hepatic/GI:  Hepatic/GI Normal                 Musculoskeletal:  Musculoskeletal Normal                Neurological:  Neurology Normal                                      Endocrine:  Endocrine Normal            Dermatological:  Skin Normal    Psych:  Psychiatric Normal                    Physical Exam  General: Alert, Oriented, Well nourished and Cooperative    Airway:  Mallampati: II   Mouth Opening: Normal  TM Distance: Normal  Tongue: Normal  Neck ROM: Normal ROM    Dental:  Intact    Chest/Lungs:  Clear to auscultation, Normal Respiratory Rate    Heart:  Rate: Normal  Rhythm: Regular Rhythm        Anesthesia Plan  Type of Anesthesia, risks & benefits discussed:    Anesthesia Type: Gen ETT  Intra-op Monitoring Plan: Standard ASA Monitors  Post Op Pain Control Plan: multimodal analgesia, IV/PO Opioids PRN and peripheral nerve block  Induction:  IV and Inhalation  Airway Plan: Direct  Informed Consent: Informed consent signed with the Patient and all parties understand the risks and agree with anesthesia plan.  All questions answered. Patient consented to blood products? Yes  ASA Score: 2  Day of Surgery Review of History & Physical: H&P Update referred to the surgeon/provider.  Anesthesia Plan Notes: Block to be performed in preOp holding. See procedure note for Block in preOp holding.  IV induction for GA/LMA.    Ready For Surgery From Anesthesia Perspective.     .

## 2024-09-30 NOTE — OP NOTE
09/30/2024    PRIMARY SURGEON: Leif Navarro MD    ASSISTANT:  Johana Centeno NP was imperative to the critical parts of the surgical case.  This included the surgical approach and dissection, retraction, placement of hardware and implants, and closure.    PREOPERATIVE DIAGNOSIS:  Left distal radius fracture  Left ulnar styloid fracture    POSTOPERATIVE DIAGNOSIS:  Same    PROCEDURES:  Open reduction and internal fixation of left distal radius fracture, extra-articular  Closed treatment of left ulnar styloid fracture    ANESTHESIA:  General    BLOOD LOSS:  Less than 20 ml    DVT PROPHYLAXIS:  Aspirin twice a day for 4 weeks    OUTCOME:  Patient tolerated treatment/procedure well without complications and is now ready for discharge.    DISPOSITION: Home/SelfCare    FOLLOW UP: In clinic    INSTRUMENTATION:  Implant Name Type Inv. Item Serial No.  Lot No. LRB No. Used Action   PLATE CROSSLOCK MINI L [id number] - [id number]  PLATE CROSSLOCK MINI L [id number]  [employer]  Left 1 Implanted   WIRE VANI 1.6MM 6IN SS - [id number]  WIRE VANI 1.6MM 6IN SS  [employer]  Left 1 Implanted and Explanted   SCREW DVR NLOK NS 2.7X12MM - [id number]  SCREW DVR NLOK NS 2.7X12MM  [employer]  Left 1 Implanted   SCREW LOCK SQUARE 2.7X12MM - [id number]  SCREW LOCK SQUARE 2.7X12MM  [employer]  Left 2 Implanted   SCREW BONE CORTICAL 2.7X16MM - [id number]  SCREW BONE CORTICAL 2.7X16MM  [employer]  Left 3 Implanted   SCREW SQUARE PRABHJOT TI 2.7X18MM - [id number]  SCREW SQUARE PRABHJOT TI 2.7X18MM  [employer]  Left 2 Implanted   SCREW SQUARE PRABHJOT TI 2.7X20MM - [id number]  SCREW SQUARE PRABHJOT TI 2.7X20MM  [employer]  Left 1 Implanted       PROCEDURE IN DETAIL:    Open reduction internal fixation of distal radius fracture    Patient was brought into the room and place on the operative table in supine position. The patient´s operative limb was prepped and draped in normal sterile fashion. A time-out was performed to confirm procedure, operative  limb, allergies, and antibiotics.     An incision was started between the FCR and the radial artery. After careful dissection, we gently retracted the radial artery radially and the FCR ulnarly. We then made an incision and the pronator quadratus and moved ulnarly. The fracture was then exposed and reduced. The locking plate was placed on radius. After good placement of plate was seen on fluoroscopy, we then placed the appropriate screws. After screw placement, we then confirmed reduction and hardware placement on distal radius with fluoroscopy. It was acceptable and then wound was thoroughly irrigated. Wound closed with 0 vicryl, 2-0 vicryl, 4-0 nylon. A sterile dressing was placed. Patient tolerated surgery without any complications.

## 2024-09-30 NOTE — ANESTHESIA PROCEDURE NOTES
Peripheral Block/Axillary block    Patient location during procedure: pre-op   Block not for primary anesthetic.  Reason for block: at surgeon's request and post-op pain management   Post-op Pain Location: Left Wrist, distal radius   Start time: 9/30/2024 8:32 AM  Timeout: 9/30/2024 8:25 AM   End time: 9/30/2024 8:34 AM    Staffing  Authorizing Provider: Dawood Slaughter MD  Performing Provider: Dawood Slaughter MD    Staffing  Performed by: Dawood Slaughter MD  Authorized by: Dawood Slaughter MD    Preanesthetic Checklist  Completed: patient identified, IV checked, site marked, risks and benefits discussed, surgical consent, monitors and equipment checked, pre-op evaluation and timeout performed  Peripheral Block  Patient position: supine  Prep: ChloraPrep  Patient monitoring: heart rate, cardiac monitor, continuous pulse ox, continuous capnometry and frequent blood pressure checks  Block type: axillary  Laterality: left  Injection technique: single shot  Needle  Needle type: Stimuplex   Needle gauge: 21 G  Needle length: 4 in  Needle localization: anatomical landmarks, ultrasound guidance and nerve stimulator   -ultrasound image captured on disc.  Assessment  Injection assessment: negative aspiration, local visualized surrounding nerve and negative parasthesia  Paresthesia pain: immediately resolved  Heart rate change: no  Slow fractionated injection: yes  Pain Tolerance: comfortable throughout block and no complaints      Additional Notes  VSS.  [facility] RN monitoring vitals throughout procedure.      After adequate sedation titrated.  Anatomy of brachial plexus and assoc.structures identified, utilizing U/S, Axillary Artery and assoc. Nerve bundles identified.  Block needle identified and guided to close proximity to target area .  After negative aspiration and test dose, Local anesthetic incrementally injected after negative aspiration,  (1 + 5mls increments) to a total volume of 35mls.  Documentation of images  stored digitally of placement of LA surrounding nerves.    VSS.  [facility] RN monitoring vitals throughout procedure.  Patient tolerated procedure well.

## 2024-09-30 NOTE — DISCHARGE INSTRUCTIONS
[facility] [place]. [address]  [phone]    DIET    YOUR FIRST MEAL SHOULD BE LIQUID: I.E. SOUPS, JELLO, JUICE. IF YOUR LIQUID MEAL IS TOLERATED WELL THEN YOU MAY PROGRESS TO A SMALL LIGHT MEAL.   IF NAUSEA AND VOMITING OCCUR RETURN TO THE LIQUID DIET AND PROGRESS TO A NORMAL SOLID DIET SLOWLY.  IF THE NAUSEA AND VOMITING DOES NOT STOP, NOTIFY YOUR HEALTH CARE PROVIDER.      GENERAL ANESTHESIA OR SEDATION    DO NOT DRIVE OR PARTICIPATE IN ANY ACTIVITIES THAT REQUIRE COORDINATION FOR THE NEXT 24 HOURS: I.E. SWIMMING, BIKING, OPERATING HEAVY MACHINERY, COOKING, USING POWER TOOLS, CLIMBING LADDERS.   FOR THE NEXT 24 HOURS DO NOT: DRIVE, DRINK ALCOHOL, MAKE ANY IMPORTANT DECISIONS OR SIGN ANY LEGAL DOCUMENTS.   STAY WITH AN ADULT DURING THE 24 HOURS AFTER YOUR SURGERY.   DRINK ENOUGH FLUIDS TO KEEP YOUR URINE CLEAR TO PALE YELLOW.      PREVENTING CONSTIPATION AFTER SURGERY    EAT FOOD HIGH IN FIBER AND DRINK PLENTY OF FLUIDS, ESPECIALLY WATER.   YOU MAY TAKE AN OVER THE COUNTER FIBER SUPPLEMENT OR STOOL SOFTENER AS DIRECTED ON THE PACKAGE.   STAYING MOBILE, IF POSSIBLE, HELPS TO PREVENT CONSTIPATION.  CONTACT YOUR DOCTOR IF YOU HAVE NOT HAD A BOWEL MOVEMENT IN 3 DAYS AFTER SURGERY.       INFECTION CONTROL    KEEP YOUR DRESSING CLEAN, DRY AND INTACT.   FOLLOW PHYSICIAN INSTRUCTIONS REGARDING REMOVAL OF DRESSING.  DO NOT TOUCH SURGICAL SITE OR APPLY LOTIONS, POWDERS, CREAMS OR OINTMENTS ON YOUR INCISION UNLESS INSTRUCTED TO DO SO BY YOUR HEALTH CARE PROVIDER.  ONCE THE DRESSING HAS BEEN REMOVED, CHECK THE INCISION SITE DAILY FOR: INCREASED REDNESS, SWELLING, FLUID OR BLOOD, WARMTH, PUS, FOUL SMELL OR INCREASED PAIN.      DEEP VEIN THROMBOSIS (DVT)    A DVT IS A BLOOD CLOT THAT CAN DEVELOP IN THE DEEP AND LARGER VEINS OF THE LEG, ARM OR PELVIS.   RISK FACTORS INCLUDE SITTING OR LYING FOR LONG PERIODS OF TIME. THIS INCLUDES RECOVERING FROM SURGERY.  PREVENTION INCLUDES:  AVOID SITTING STILL FOR LONG PERIODS WITHOUT MOVING YOUR LEGS, DO NOT SMOKE AND IF POSSIBLE AVOID MEDICATIONS THAT CONTAIN ESTROGEN.   SIGNS AND SYMPTOMS THAT SHOULD BE REPORTED IMMEDIATELY INCLUDE: SWELLING IN AN ARM OR LEG, WARMTH, REDNESS OR PAIN IN ONE AREA OF THE LEG OR ARM THAT DOES NOT COME FROM THE INCISION. IF THE CLOT IS IN YOUR LEG, THE SYMPTOMS WILL BE WORSE WHEN STANDING OR WALKING.   SIGNS OF A PULMONARY EMBOLISM OR PE (A CLOT THAT MOVED TO YOUR LUNG): SHORTNESS OF BREATH, COUGHING , ESPECIALLY IF ACCOMPANIED WITH BLOODY MUCUS, CHEST PAIN OR RAPID HEART RATE.  IF YOU EXPERIENCE THESE SYMPTOMS, YOU SHOULD GET EMERGENCY TREATMENT RIGHT AWAY. DO NOT WAIT TO SEE IF THESE SYMPTOMS WILL GO AWAY. DO NOT DRIVE YOURSELF TO THE HOSPITAL.       CONTACT YOUR HEALTH CARE PROVIDER IF:    YOU HAVE REDNESS, SWELLING OR PAIN AROUND YOUR INCISION.  YOUR INCISION FEELS WARM TO THE TOUCH OR IS LEAKING EXCESSIVE FLUID/ BLOOD.  YOUR SUTURES OR STAPLES HAVE COME UNDONE.  YOU HAVE A TEMPERATURE .5 OR GREATER.  YOU ARE NOT ABLE TO URINATE WITHIN 6 HOURS AFTER SURGERY.  YOU HAVE UNRESOLVED NAUSEA AND VOMITING.       SEEK IMMEDIATE MEDICAL CARE IF:    YOU HAVE PERSISTENT NAUSEA AND VOMITING.   YOU ARE UNABLE TO EAT OR DRINK.   YOU HAVE DIFFICULTY SPEAKING AND/ OR BREATHING.  YOUR SKIN COLOR APPEARS BLUE OR GRAY.  YOU HAVE A RED STREAK COMING FROM YOUR INCISION.  YOUR INCISION BLEEDS THROUGH THE DRESSING AND DOES NOT STOP WITH GENTLY PRESSURE.  YOU HAVE SEVERE PAIN THAT DOES NOT DECREASE WITH YOUR MEDICATIONS.

## 2024-10-01 ENCOUNTER — TELEPHONE (OUTPATIENT)
Dept: SURGERY | Facility: HOSPITAL | Age: 59
End: 2024-10-01
Payer: COMMERCIAL

## 2024-10-01 VITALS
DIASTOLIC BLOOD PRESSURE: 77 MMHG | TEMPERATURE: 97 F | BODY MASS INDEX: 26.33 KG/M2 | HEIGHT: 62 IN | HEART RATE: 59 BPM | SYSTOLIC BLOOD PRESSURE: 137 MMHG | OXYGEN SATURATION: 97 % | RESPIRATION RATE: 18 BRPM | WEIGHT: 143.06 LBS

## 2024-10-01 NOTE — TELEPHONE ENCOUNTER
Spoke to patient and she states her pain is shooting from left little finger up to wrist. She is taking all prescribed medications, as prescribed. She last took Oxycodone 5mg at 0830am. I did let her know that she can take an additional oxycodone right now with 1000mg tylenol, and then keep her same schedule with all other medications. She can take that 1000mg tylenol with the oxycodone dose each time. She voiced understanding and will call me back this afternoon if that does not help to control her pain. She is also using ice and keeping her arm elevated to prevent swelling.

## 2024-10-07 PROBLEM — Z00.00 WELLNESS EXAMINATION: Status: RESOLVED | Noted: 2024-07-03 | Resolved: 2024-10-07

## 2024-10-08 ENCOUNTER — OFFICE VISIT (OUTPATIENT)
Dept: ORTHOPEDICS | Facility: CLINIC | Age: 59
End: 2024-10-08
Payer: COMMERCIAL

## 2024-10-08 VITALS
WEIGHT: 143.06 LBS | HEART RATE: 69 BPM | BODY MASS INDEX: 26.33 KG/M2 | DIASTOLIC BLOOD PRESSURE: 83 MMHG | SYSTOLIC BLOOD PRESSURE: 171 MMHG | HEIGHT: 62 IN

## 2024-10-08 DIAGNOSIS — Z87.81 STATUS POST OPEN REDUCTION AND INTERNAL FIXATION (ORIF) OF FRACTURE: Primary | ICD-10-CM

## 2024-10-08 DIAGNOSIS — S52.532D CLOSED COLLES' FRACTURE OF LEFT RADIUS WITH ROUTINE HEALING, SUBSEQUENT ENCOUNTER: ICD-10-CM

## 2024-10-08 DIAGNOSIS — Z98.890 STATUS POST OPEN REDUCTION AND INTERNAL FIXATION (ORIF) OF FRACTURE: Primary | ICD-10-CM

## 2024-10-08 PROCEDURE — 1159F MED LIST DOCD IN RCRD: CPT | Mod: CPTII,,, | Performed by: ORTHOPAEDIC SURGERY

## 2024-10-08 PROCEDURE — 3079F DIAST BP 80-89 MM HG: CPT | Mod: CPTII,,, | Performed by: ORTHOPAEDIC SURGERY

## 2024-10-08 PROCEDURE — 99024 POSTOP FOLLOW-UP VISIT: CPT | Mod: ,,, | Performed by: ORTHOPAEDIC SURGERY

## 2024-10-08 PROCEDURE — 3077F SYST BP >= 140 MM HG: CPT | Mod: CPTII,,, | Performed by: ORTHOPAEDIC SURGERY

## 2024-10-08 PROCEDURE — 3044F HG A1C LEVEL LT 7.0%: CPT | Mod: CPTII,,, | Performed by: ORTHOPAEDIC SURGERY

## 2024-10-08 PROCEDURE — 1160F RVW MEDS BY RX/DR IN RCRD: CPT | Mod: CPTII,,, | Performed by: ORTHOPAEDIC SURGERY

## 2024-10-08 RX ORDER — HYDROCODONE BITARTRATE AND ACETAMINOPHEN 7.5; 325 MG/1; MG/1
1 TABLET ORAL EVERY 6 HOURS PRN
Qty: 28 TABLET | Refills: 0 | Status: SHIPPED | OUTPATIENT
Start: 2024-10-08

## 2024-10-08 RX ORDER — KETOROLAC TROMETHAMINE 10 MG/1
10 TABLET, FILM COATED ORAL DAILY PRN
Qty: 7 TABLET | Refills: 0 | Status: SHIPPED | OUTPATIENT
Start: 2024-10-08 | End: 2024-10-15

## 2024-10-08 NOTE — PROGRESS NOTES
[facility]  Orthopedic Clinic Note      Chief Complaint:   Chief Complaint   Patient presents with    Left Wrist - Post-op Evaluation     1 week Sp Left ORIF Wrist sx 24, GL 24, splinted, as well as to be expected, itching underneath , pain goes up and down, has a little bruise in the shoulder area and wondering where it came from, sometimes gets sharp shooting pain     Referring Physician: No ref. provider found      History of Present Illness:    2024 PROCEDURES:  Open reduction and internal fixation of left distal radius fracture, extra-articular  Closed treatment of left ulnar styloid fracture  10/08/2024 patient presents 1 week status post the above-noted procedure.  Doing well with no major issues or concerns.  Pain well controlled.  Compliant with splint.      Past Medical History:   Diagnosis Date    PONV (postoperative nausea and vomiting)     Radius fracture     Raynaud's syndrome        Past Surgical History:   Procedure Laterality Date     SECTION      x 2     SECTION  Mar 1997 and May 2000    OPEN REDUCTION AND INTERNAL FIXATION (ORIF) OF FRACTURE OF RADIUS Left 2024    Procedure: ORIF, FRACTURE, RADIUS - Bi[facility];  Surgeon: Leif Navarro MD;  Location: [facility];  Service: Orthopedics;  Laterality: Left;       Current Outpatient Medications   Medication Sig    folic acid 0.8 mg Cap Take 1 mg by mouth once daily.    meloxicam (MOBIC) 7.5 MG tablet Starting on Post-op Day 6, take Mobic 7.5mg twice a day for 9 days. After 9 days, take Daily, AS NEEDED for pain    methocarbamoL (ROBAXIN) 750 MG Tab Take 1 tablet (750 mg total) by mouth every 6 (six) hours. for 14 days    ondansetron (ZOFRAN-ODT) 4 MG TbDL Take 1 tablet (4 mg total) by mouth every 6 (six) hours as needed (Nausea/Vomiting).    oxyCODONE (ROXICODONE) 5 MG immediate release tablet Take 1 tablet (5 mg total) by mouth every 12 (twelve) hours as needed (For BREAKTHROUGH PAIN ONLY).    gabapentin  (NEURONTIN) 300 MG capsule Take 1 capsule (300 mg total) by mouth every 8 (eight) hours. for 5 days    HYDROcodone-acetaminophen (NORCO) 7.5-325 mg per tablet Take 1 tablet by mouth every 6 (six) hours as needed for Pain.    ketorolac (TORADOL) 10 mg tablet Take 1 tablet (10 mg total) by mouth daily as needed for Pain. Take with food    meloxicam (MOBIC) 7.5 MG tablet Starting on Post-op Day 6, take Mobic 7.5mg twice a day for 9 days. After 9 days, take Daily, AS NEEDED for pain (Patient not taking: Reported on 10/8/2024)    methocarbamoL (ROBAXIN) 750 MG Tab Take 1 tablet (750 mg total) by mouth every 6 (six) hours. for 14 days (Patient not taking: Reported on 10/8/2024)    ondansetron (ZOFRAN-ODT) 4 MG TbDL Take 1 tablet (4 mg total) by mouth every 6 (six) hours as needed (Nausea/Vomiting). (Patient not taking: Reported on 10/8/2024)     No current facility-administered medications for this visit.       Review of patient's allergies indicates:   Allergen Reactions    Sulfa (sulfonamide antibiotics) Hives and Itching       Family History   Problem Relation Name Age of Onset    Breast cancer Mother Glendy Rodas 65    Heart disease Mother Glendy Rodas     Cancer Mother Glendy Rodas         breast cancer; angiosarcoma    Leukemia Father Frederick Rodas     Hypertension Father Frederick Rodas     Hearing loss Father Frederick Rodas         hard of hearing    Cerebral aneurysm Brother      Breast cancer Paternal Grandmother Aditi Rodas 75    Cancer Paternal Grandmother Aditi Rodas         breast cancer    Hearing loss Paternal Grandmother Aditi Rodas         hard of hearing    Colon cancer Paternal Aunt  70    Cancer Paternal Aunt Glenys Cohen         colon cancer    Hearing loss Paternal Aunt Glenys Cohen         hard of hearing    Early death Brother Holland Rodas         cerebral aneurysm age 35    Early death Brother Lionel Rodas         cardiac arrest age 50    Heart disease Brother Lionel Rodas   "      Social History     Socioeconomic History    Marital status:    Tobacco Use    Smoking status: Never    Smokeless tobacco: Never   Substance and Sexual Activity    Alcohol use: Yes     Alcohol/week: 12.0 standard drinks of alcohol     Types: 10 Glasses of wine, 2 Drinks containing 0.5 oz of alcohol per week     Comment: Socially (3-4 times a week)    Drug use: Yes     Types: Oxycodone    Sexual activity: Yes     Partners: Male     Birth control/protection: None     Social Drivers of Health     Financial Resource Strain: Low Risk  (7/1/2024)    Overall Financial Resource Strain (CARDIA)     Difficulty of Paying Living Expenses: Not very hard   Food Insecurity: No Food Insecurity (7/1/2024)    Hunger Vital Sign     Worried About Running Out of Food in the Last Year: Never true     Ran Out of Food in the Last Year: Never true   Physical Activity: Sufficiently Active (7/1/2024)    Exercise Vital Sign     Days of Exercise per Week: 3 days     Minutes of Exercise per Session: 150+ min   Stress: Stress Concern Present (7/1/2024)    [employer] - Occupational Stress Questionnaire     Feeling of Stress : To some extent   Housing Stability: Unknown (7/1/2024)    Housing Stability Vital Sign     Unable to Pay for Housing in the Last Year: No           Review of Systems:  All review of systems negative except for those stated in the HPI.    Examination:    Vital Signs:    Vitals:    10/08/24 0833 10/08/24 0839   BP: (!) 154/89 (!) 171/83   Pulse: 67 69   Weight: 64.9 kg (143 lb 1.3 oz)    Height: 5' 2" (1.575 m)    PainSc:   2    PainLoc: Wrist        Body mass index is 26.17 kg/m².    Physical Examination:  General: Well-developed, well-nourished.  Neuro: Alert and oriented x 3.  Psych: Normal mood and affect.  Card: Regular rate and rhythm  Resp: Respirations regular and unlabored  Left upper extremity: Incisions clean, dry, and intact.  No signs of infection.  Neurovascular intact " distally.  Sensation intact.  Splint in place.      Imaging:  Prior two views of the left wrist demonstrate expected postoperative changes following open reduction with internal fixation of distal radius fracture with improved alignment.    Assessment: Status post open reduction and internal fixation (ORIF) of fracture  -     ketorolac (TORADOL) 10 mg tablet; Take 1 tablet (10 mg total) by mouth daily as needed for Pain. Take with food  Dispense: 7 tablet; Refill: 0  -     HYDROcodone-acetaminophen (NORCO) 7.5-325 mg per tablet; Take 1 tablet by mouth every 6 (six) hours as needed for Pain.  Dispense: 28 tablet; Refill: 0    Closed Colles' fracture of left radius with routine healing, subsequent encounter  -     HYDROcodone-acetaminophen (NORCO) 7.5-325 mg per tablet; Take 1 tablet by mouth every 6 (six) hours as needed for Pain.  Dispense: 28 tablet; Refill: 0        Plan:  This patient is doing well.  She will come back next week for suture removal.  I refilled her Toradol but she will only take that once a day as needed.  I discontinued her oxycodone and placed her on Norco 7.5. Patient acknowledges their understanding and agreement with the plan.    Leif Navarro MD personally performed the services described in this documentation, including but not limited to patient's history, physical examination, and assessment and plan of care. All medical record entries made by RAINER Almeida were performed at his direction and in his presence. The medical record was reviewed and is accurate and complete.         Follow up in about 1 week (around 10/15/2024) for Wound evaluation and suture removal.      DISCLAIMER: This note may have been dictated using voice recognition software and may contain grammatical errors.     NOTE: Consult report sent to referring provider via [employer].

## 2024-10-15 ENCOUNTER — OFFICE VISIT (OUTPATIENT)
Dept: ORTHOPEDICS | Facility: CLINIC | Age: 59
End: 2024-10-15
Payer: COMMERCIAL

## 2024-10-15 VITALS
SYSTOLIC BLOOD PRESSURE: 137 MMHG | BODY MASS INDEX: 26.33 KG/M2 | DIASTOLIC BLOOD PRESSURE: 84 MMHG | HEART RATE: 70 BPM | HEIGHT: 62 IN | WEIGHT: 143.06 LBS

## 2024-10-15 DIAGNOSIS — S52.532D CLOSED COLLES' FRACTURE OF LEFT RADIUS WITH ROUTINE HEALING, SUBSEQUENT ENCOUNTER: ICD-10-CM

## 2024-10-15 DIAGNOSIS — Z98.890 STATUS POST OPEN REDUCTION AND INTERNAL FIXATION (ORIF) OF FRACTURE: Primary | ICD-10-CM

## 2024-10-15 DIAGNOSIS — Z87.81 STATUS POST OPEN REDUCTION AND INTERNAL FIXATION (ORIF) OF FRACTURE: Primary | ICD-10-CM

## 2024-10-15 PROCEDURE — 3079F DIAST BP 80-89 MM HG: CPT | Mod: CPTII,,,

## 2024-10-15 PROCEDURE — 99024 POSTOP FOLLOW-UP VISIT: CPT | Mod: ,,,

## 2024-10-15 PROCEDURE — 1159F MED LIST DOCD IN RCRD: CPT | Mod: CPTII,,,

## 2024-10-15 PROCEDURE — 3075F SYST BP GE 130 - 139MM HG: CPT | Mod: CPTII,,,

## 2024-10-15 PROCEDURE — 3044F HG A1C LEVEL LT 7.0%: CPT | Mod: CPTII,,,

## 2024-10-15 NOTE — PROGRESS NOTES
[facility]opaedic Clinic  Orthopedic Clinic Note      Chief Complaint:   Chief Complaint   Patient presents with    Follow-up     Patient here today for suture removal left wrist. She is now 2w s/p ORIF fx left radius sx on 24. She reports some pain, no numbness and occasional tingling. Pain much better now that just a few days ago. Pain is more so at night.      Referring Physician: No ref. provider found      History of Present Illness:    2024 PROCEDURES:  Open reduction and internal fixation of left distal radius fracture, extra-articular  Closed treatment of left ulnar styloid fracture  10/08/2024 patient presents 1 week status post the above-noted procedure.  Doing well with no major issues or concerns.  Pain well controlled.  Compliant with splint.  10/15/2024 patient presents 2 weeks status post the above-noted procedure.  Doing well with no major issues or concerns.  Pain improving since prior visit.      Past Medical History:   Diagnosis Date    PONV (postoperative nausea and vomiting)     Radius fracture     Raynaud's syndrome        Past Surgical History:   Procedure Laterality Date     SECTION      x 2     SECTION  Mar 1997 and May 2000    OPEN REDUCTION AND INTERNAL FIXATION (ORIF) OF FRACTURE OF RADIUS Left 2024    Procedure: ORIF, FRACTURE, RADIUS - Biomet;  Surgeon: Leif Navarro MD;  Location: [facility];  Service: Orthopedics;  Laterality: Left;       Current Outpatient Medications   Medication Sig    folic acid 0.8 mg Cap Take 1 mg by mouth once daily.    HYDROcodone-acetaminophen (NORCO) 7.5-325 mg per tablet Take 1 tablet by mouth every 6 (six) hours as needed for Pain.    ketorolac (TORADOL) 10 mg tablet Take 1 tablet (10 mg total) by mouth daily as needed for Pain. Take with food    meloxicam (MOBIC) 7.5 MG tablet Starting on Post-op Day 6, take Mobic 7.5mg twice a day for 9 days. After 9 days, take Daily, AS NEEDED for pain    meloxicam (MOBIC) 7.5 MG tablet  Starting on Post-op Day 6, take Mobic 7.5mg twice a day for 9 days. After 9 days, take Daily, AS NEEDED for pain    gabapentin (NEURONTIN) 300 MG capsule Take 1 capsule (300 mg total) by mouth every 8 (eight) hours. for 5 days     No current facility-administered medications for this visit.       Review of patient's allergies indicates:   Allergen Reactions    Sulfa (sulfonamide antibiotics) Hives and Itching       Family History   Problem Relation Name Age of Onset    Breast cancer Mother Glendy Rodas 65    Heart disease Mother Glendy Rodas     Cancer Mother Glendy Rodas         breast cancer; angiosarcoma    Leukemia Father Frederick Rodas     Hypertension Father Frederick Rodas     Hearing loss Father Frederick Rodas         hard of hearing    Cerebral aneurysm Brother      Breast cancer Paternal Grandmother Aditi Rodas 75    Cancer Paternal Grandmother Aditi Rodas         breast cancer    Hearing loss Paternal Grandmother Aditi Rodas         hard of hearing    Colon cancer Paternal Aunt  70    Cancer Paternal Aunt Glenys Cohen         colon cancer    Hearing loss Paternal Aunt Glenys Cohen         hard of hearing    Early death Brother Holland Rodas         cerebral aneurysm age 35    Early death Brother Lionel Rodas         cardiac arrest age 50    Heart disease Brother Lionel Rodas        Social History     Socioeconomic History    Marital status:    Tobacco Use    Smoking status: Never    Smokeless tobacco: Never   Substance and Sexual Activity    Alcohol use: Yes     Alcohol/week: 12.0 standard drinks of alcohol     Types: 10 Glasses of wine, 2 Drinks containing 0.5 oz of alcohol per week     Comment: Socially (3-4 times a week)    Drug use: Yes     Types: Oxycodone    Sexual activity: Yes     Partners: Male     Birth control/protection: None     Social Drivers of Health     Financial Resource Strain: Low Risk  (7/1/2024)    Overall Financial Resource Strain (CARDIA)     Difficulty  "of Paying Living Expenses: Not very hard   Food Insecurity: No Food Insecurity (7/1/2024)    Hunger Vital Sign     Worried About Running Out of Food in the Last Year: Never true     Ran Out of Food in the Last Year: Never true   Physical Activity: Sufficiently Active (7/1/2024)    Exercise Vital Sign     Days of Exercise per Week: 3 days     Minutes of Exercise per Session: 150+ min   Stress: Stress Concern Present (7/1/2024)    [employer] [place] [employer] - Occupational Stress Questionnaire     Feeling of Stress : To some extent   Housing Stability: Unknown (7/1/2024)    Housing Stability Vital Sign     Unable to Pay for Housing in the Last Year: No           Review of Systems:  All review of systems negative except for those stated in the HPI.    Examination:    Vital Signs:    Vitals:    10/15/24 0935   BP: 137/84   Pulse: 70   Weight: 64.9 kg (143 lb 1.3 oz)   Height: 5' 2" (1.575 m)         Body mass index is 26.17 kg/m².    Physical Examination:  General: Well-developed, well-nourished.  Neuro: Alert and oriented x 3.  Psych: Normal mood and affect.  Card: Regular rate and rhythm  Resp: Respirations regular and unlabored  Left Wrist Exam:  No obvious deformity.  Incisions clean and dry with sutures intact.  Range of motion limited secondary to pain, as expected.  Normal skin appearance and palpable pulses and CR<2.  Sensation intact to light touch.      Imaging:  Prior two views of the left wrist demonstrate expected postoperative changes following open reduction with internal fixation of distal radius fracture with improved alignment.    Assessment: Status post open reduction and internal fixation (ORIF) of fracture    Closed Colles' fracture of left radius with routine healing, subsequent encounter        Plan:  Sutures removed and Steri-Strips applied.  Advised that she avoid submerging the wound in water for an additional 2 weeks.  Continue previously prescribed medications as needed for pain. "  Range of motion as tolerated, nonweightbearing.  She will return to clinic in approximately weeks for re-evaluation with repeat x-rays.  She verbalized understanding of the plan of care with no further questions.         Follow up in about 2 weeks (around 10/29/2024) for Reevaluation with repeat X-rays.      DISCLAIMER: This note may have been dictated using voice recognition software and may contain grammatical errors.     NOTE: Consult report sent to referring provider via [employer].

## 2024-10-18 ENCOUNTER — OFFICE VISIT (OUTPATIENT)
Dept: FAMILY MEDICINE | Facility: CLINIC | Age: 59
End: 2024-10-18
Payer: COMMERCIAL

## 2024-10-18 VITALS
WEIGHT: 141.63 LBS | HEIGHT: 62 IN | DIASTOLIC BLOOD PRESSURE: 84 MMHG | RESPIRATION RATE: 18 BRPM | SYSTOLIC BLOOD PRESSURE: 144 MMHG | HEART RATE: 70 BPM | OXYGEN SATURATION: 98 % | TEMPERATURE: 98 F | BODY MASS INDEX: 26.06 KG/M2

## 2024-10-18 DIAGNOSIS — M79.605 LEFT LEG PAIN: Primary | ICD-10-CM

## 2024-10-18 DIAGNOSIS — Z23 FLU VACCINE NEED: ICD-10-CM

## 2024-10-18 PROCEDURE — 99213 OFFICE O/P EST LOW 20 MIN: CPT | Mod: 25,,, | Performed by: FAMILY MEDICINE

## 2024-10-18 PROCEDURE — 1160F RVW MEDS BY RX/DR IN RCRD: CPT | Mod: CPTII,,, | Performed by: FAMILY MEDICINE

## 2024-10-18 PROCEDURE — 3044F HG A1C LEVEL LT 7.0%: CPT | Mod: CPTII,,, | Performed by: FAMILY MEDICINE

## 2024-10-18 PROCEDURE — 3079F DIAST BP 80-89 MM HG: CPT | Mod: CPTII,,, | Performed by: FAMILY MEDICINE

## 2024-10-18 PROCEDURE — 1159F MED LIST DOCD IN RCRD: CPT | Mod: CPTII,,, | Performed by: FAMILY MEDICINE

## 2024-10-18 PROCEDURE — 3008F BODY MASS INDEX DOCD: CPT | Mod: CPTII,,, | Performed by: FAMILY MEDICINE

## 2024-10-18 PROCEDURE — 90656 IIV3 VACC NO PRSV 0.5 ML IM: CPT | Mod: ,,, | Performed by: FAMILY MEDICINE

## 2024-10-18 PROCEDURE — 90471 IMMUNIZATION ADMIN: CPT | Mod: ,,, | Performed by: FAMILY MEDICINE

## 2024-10-18 PROCEDURE — 3077F SYST BP >= 140 MM HG: CPT | Mod: CPTII,,, | Performed by: FAMILY MEDICINE

## 2024-10-18 RX ORDER — MELOXICAM 7.5 MG/1
7.5 TABLET ORAL
COMMUNITY
Start: 2024-09-30

## 2024-10-18 RX ORDER — DICLOFENAC SODIUM 10 MG/G
2 GEL TOPICAL 4 TIMES DAILY
Qty: 50 G | Refills: 0 | Status: SHIPPED | OUTPATIENT
Start: 2024-10-18

## 2024-10-18 NOTE — PROGRESS NOTES
"Subjective:      Patient ID: Claudette M Malveaux Washington is a 59 y.o. female.    Chief Complaint: Leg Pain (Pt c/o posterior left thigh pain approx 2 months. States pain is "constant" 6/10. )    Disclaimer:  This note is prepared using voice recognition software and as such is likely to have errors despite attempts at proofreading. Please contact me for questions.     59-year-old female who presents for follow-up of left thigh pain.  The patient states that symptoms have persisted however she did not return for follow-up initially as she recently had a left wrist injury.  She states that NSAIDs prescribed for her wrist injury did improve pain in her thigh however symptoms have recurred.  The patient states that the pain now appears to be in the lateral thigh radiating from the hip.  She denies any trauma and describes the pain as 6/10.       Past Medical History:   Diagnosis Date    PONV (postoperative nausea and vomiting)     Radius fracture     Raynaud's syndrome         Current Outpatient Medications on File Prior to Visit   Medication Sig Dispense Refill    folic acid 0.8 mg Cap Take 1 mg by mouth once daily.      HYDROcodone-acetaminophen (NORCO) 7.5-325 mg per tablet Take 1 tablet by mouth every 6 (six) hours as needed for Pain. 28 tablet 0    meloxicam (MOBIC) 7.5 MG tablet Take 7.5 mg by mouth as needed for Pain.      gabapentin (NEURONTIN) 300 MG capsule Take 1 capsule (300 mg total) by mouth every 8 (eight) hours. for 5 days 15 capsule 0     No current facility-administered medications on file prior to visit.        Review of patient's allergies indicates:   Allergen Reactions    Sulfa (sulfonamide antibiotics) Hives and Itching      Review of Systems   HENT:  Negative for hearing loss.    Eyes:  Negative for discharge.   Respiratory:  Negative for wheezing.    Cardiovascular:  Negative for chest pain and palpitations.   Gastrointestinal:  Negative for blood in stool, constipation, diarrhea and " "vomiting.   Genitourinary:  Negative for dysuria and hematuria.   Musculoskeletal:  Positive for myalgias. Negative for falls and neck pain.   Neurological:  Negative for weakness and headaches.   Endo/Heme/Allergies:  Negative for polydipsia.       Objective:     Vitals:    10/18/24 0832   BP: (!) 144/84   BP Location: Right arm   Patient Position: Sitting   Pulse: 70   Resp: 18   Temp: 97.9 °F (36.6 °C)   TempSrc: Oral   SpO2: 98%   Weight: 64.2 kg (141 lb 9.6 oz)   Height: 5' 2" (1.575 m)     Physical Exam  Constitutional:       General: She is not in acute distress.     Appearance: Normal appearance. She is not ill-appearing, toxic-appearing or diaphoretic.   HENT:      Head: Normocephalic and atraumatic.      Right Ear: External ear normal.      Left Ear: External ear normal.      Nose: Nose normal.   Eyes:      Extraocular Movements: Extraocular movements intact.      Conjunctiva/sclera: Conjunctivae normal.   Pulmonary:      Effort: Pulmonary effort is normal. No respiratory distress.   Musculoskeletal:      Cervical back: Normal range of motion.      Lumbar back: Tenderness present. No swelling, edema, deformity, signs of trauma, spasms or bony tenderness. Normal range of motion. Negative right straight leg raise test and negative left straight leg raise test.      Right hip: Normal range of motion.      Left hip: No deformity, tenderness, bony tenderness or crepitus. Normal range of motion. Normal strength.      Right upper leg: No swelling, edema or deformity.      Left upper leg: Tenderness (Along iliotibial band) present. No swelling, edema, deformity or bony tenderness.      Comments: Negative FADIR, LAURA and obturator signs   Skin:     Coloration: Skin is not pale.   Neurological:      General: No focal deficit present.      Mental Status: She is alert and oriented to person, place, and time. Mental status is at baseline.   Psychiatric:         Mood and Affect: Mood normal.         Behavior: Behavior " normal.         Thought Content: Thought content normal.         Judgment: Judgment normal.         Assessment:     1. Left leg pain    2. Flu vaccine need      Plan:     1. Left leg pain  - US Extremity Non Vascular Limited Left; Future  - diclofenac sodium (VOLTAREN) 1 % Gel; Apply 2 g topically 4 (four) times daily.  Dispense: 50 g; Refill: 0  - US Lower Extremity Veins Left; Future  Patient repeatedly states concerns about VTE and possible varicosities.  Venous ultrasound ordered and pending.  Patient also concerned about soft tissue injury.  ROM exercises given, HEP, warm compresses, consider PT if pain does not improve  Continue NSAIDs PRN, patient also taking Norco as needed for wrist fracture.  Contact clinic for any questions or concerns and notify MD if symptoms worsen or not improving.     2. Flu vaccine need  - influenza (Flulaval, Fluzone, Fluarix) 45 mcg/0.5 mL IM vaccine (> or = 6 mo) 0.5 mL      Follow up if symptoms worsen or fail to improve.  Claudette M Malveaux Washington was given education on their disease process and medications.

## 2024-11-05 ENCOUNTER — OFFICE VISIT (OUTPATIENT)
Dept: ORTHOPEDICS | Facility: CLINIC | Age: 59
End: 2024-11-05
Payer: COMMERCIAL

## 2024-11-05 ENCOUNTER — HOSPITAL ENCOUNTER (OUTPATIENT)
Dept: RADIOLOGY | Facility: CLINIC | Age: 59
Discharge: HOME | End: 2024-11-05
Payer: COMMERCIAL

## 2024-11-05 VITALS
WEIGHT: 141.56 LBS | BODY MASS INDEX: 26.05 KG/M2 | SYSTOLIC BLOOD PRESSURE: 148 MMHG | HEIGHT: 62 IN | HEART RATE: 71 BPM | DIASTOLIC BLOOD PRESSURE: 87 MMHG

## 2024-11-05 DIAGNOSIS — Z98.890 STATUS POST OPEN REDUCTION AND INTERNAL FIXATION (ORIF) OF FRACTURE: Primary | ICD-10-CM

## 2024-11-05 DIAGNOSIS — Z98.890 STATUS POST OPEN REDUCTION AND INTERNAL FIXATION (ORIF) OF FRACTURE: ICD-10-CM

## 2024-11-05 DIAGNOSIS — Z87.81 STATUS POST OPEN REDUCTION AND INTERNAL FIXATION (ORIF) OF FRACTURE: ICD-10-CM

## 2024-11-05 DIAGNOSIS — Z87.81 STATUS POST OPEN REDUCTION AND INTERNAL FIXATION (ORIF) OF FRACTURE: Primary | ICD-10-CM

## 2024-11-05 PROCEDURE — 73110 X-RAY EXAM OF WRIST: CPT | Mod: LT,,,

## 2024-11-05 NOTE — PROGRESS NOTES
[facility]  Orthopedic Clinic Note      Chief Complaint:   Chief Complaint   Patient presents with    Left Wrist - Follow-up     f/u L ORIF Wrist sx 24, GL 24, reports stiffness but denies pain, not taking any pain meds     Referring Physician: No ref. provider found      History of Present Illness:    2024 PROCEDURES:  Open reduction and internal fixation of left distal radius fracture, extra-articular  Closed treatment of left ulnar styloid fracture  10/08/2024 patient presents 1 week status post the above-noted procedure.  Doing well with no major issues or concerns.  Pain well controlled.  Compliant with splint.  10/15/2024 patient presents 2 weeks status post the above-noted procedure.  Doing well with no major issues or concerns.  Pain improving since prior visit.  2024 patient presents 2 months status post the above-noted procedure.  Doing well with no major issues or concerns.  Some stiffness, but she denies any residual pain in the operative extremity.      Past Medical History:   Diagnosis Date    PONV (postoperative nausea and vomiting)     Radius fracture     Raynaud's syndrome        Past Surgical History:   Procedure Laterality Date     SECTION      x 2     SECTION  Mar 1997 and May 2000    OPEN REDUCTION AND INTERNAL FIXATION (ORIF) OF FRACTURE OF RADIUS Left 2024    Procedure: ORIF, FRACTURE, RADIUS - Biomet;  Surgeon: Leif Navarro MD;  Location: [facility];  Service: Orthopedics;  Laterality: Left;       Current Outpatient Medications   Medication Sig    folic acid 0.8 mg Cap Take 1 mg by mouth once daily.    diclofenac sodium (VOLTAREN) 1 % Gel Apply 2 g topically 4 (four) times daily. (Patient not taking: Reported on 2024)    gabapentin (NEURONTIN) 300 MG capsule Take 1 capsule (300 mg total) by mouth every 8 (eight) hours. for 5 days    HYDROcodone-acetaminophen (NORCO) 7.5-325 mg per tablet Take 1 tablet by mouth every 6 (six) hours as  needed for Pain. (Patient not taking: Reported on 11/5/2024)    meloxicam (MOBIC) 7.5 MG tablet Take 7.5 mg by mouth as needed for Pain. (Patient not taking: Reported on 11/5/2024)     No current facility-administered medications for this visit.       Review of patient's allergies indicates:   Allergen Reactions    Sulfa (sulfonamide antibiotics) Hives and Itching       Family History   Problem Relation Name Age of Onset    Breast cancer Mother Glendy Rodas 65    Heart disease Mother Glendy Rodas     Cancer Mother Glendy Rodas         breast cancer; angiosarcoma    Leukemia Father Frederick Rodas     Hypertension Father Frederick Rodas     Hearing loss Father Frederick Rodas         hard of hearing    Cerebral aneurysm Brother      Breast cancer Paternal Grandmother Aditi Rodas 75    Cancer Paternal Grandmother Aditi Rodas         breast cancer    Hearing loss Paternal Grandmother Aditi Rodas         hard of hearing    Colon cancer Paternal Aunt  70    Cancer Paternal Aunt Glenys Cohen         colon cancer    Hearing loss Paternal Aunt Glenys Cohen         hard of hearing    Early death Brother Holland Rodas         cerebral aneurysm age 35    Early death Brother Lionel Rodas         cardiac arrest age 50    Heart disease Brother Lionel Rodas        Social History     Socioeconomic History    Marital status:    Tobacco Use    Smoking status: Never    Smokeless tobacco: Never   Substance and Sexual Activity    Alcohol use: Yes     Alcohol/week: 12.0 standard drinks of alcohol     Types: 10 Glasses of wine, 2 Drinks containing 0.5 oz of alcohol per week     Comment: Socially (3-4 times a week)    Drug use: Yes     Types: Oxycodone    Sexual activity: Yes     Partners: Male     Birth control/protection: None     Social Drivers of Health     Financial Resource Strain: Low Risk  (10/18/2024)    Overall Financial Resource Strain (CARDIA)     Difficulty of Paying Living Expenses: Not very hard  "  Food Insecurity: No Food Insecurity (10/18/2024)    Hunger Vital Sign     Worried About Running Out of Food in the Last Year: Never true     Ran Out of Food in the Last Year: Never true   Transportation Needs: No Transportation Needs (10/18/2024)    TRANSPORTATION NEEDS     Transportation : No   Physical Activity: Sufficiently Active (10/18/2024)    Exercise Vital Sign     Days of Exercise per Week: 2 days     Minutes of Exercise per Session: 90 min   Stress: No Stress Concern Present (10/18/2024)    [employer] - Occupational Stress Questionnaire     Feeling of Stress : Not at all   Housing Stability: Low Risk  (10/18/2024)    Housing Stability Vital Sign     Unable to Pay for Housing in the Last Year: No     Homeless in the Last Year: No           Review of Systems:  All review of systems negative except for those stated in the HPI.    Examination:    Vital Signs:    Vitals:    11/05/24 0840   Weight: 64.2 kg (141 lb 8.6 oz)   Height: 5' 2" (1.575 m)   PainSc: 0-No pain           Body mass index is 25.89 kg/m².    Physical Examination:  General: Well-developed, well-nourished.  Neuro: Alert and oriented x 3.  Psych: Normal mood and affect.  Card: Regular rate and rhythm  Resp: Respirations regular and unlabored  Left Wrist Exam:  No obvious deformity.  Incisions clean and dry with sutures intact.  Range of motion limited secondary to pain, as expected.  Normal skin appearance and palpable pulses and CR<2.  Sensation intact to light touch.      Imaging:  3 views of the left wrist demonstrate healing fracture of the left distal radius and ulnar styloid.  Hardware intact with no evidence of loosening.    Assessment: Status post open reduction and internal fixation (ORIF) of fracture  -     X-Ray Wrist Complete Left; Future; Expected date: 11/05/2024        Plan:  X-rays were reviewed with the patient.  She will continue to avoid lifting more than 5 lb with the operative wrist for the " next 4 weeks.  At 12 weeks postop, she can resume all normal activities with no restrictions.  We discussed possible referral to physical therapy, but she would like to defer for now.  Home exercise programs provided with active range of motion exercises to be started now to address stiffness and band exercises to be started in 3-4 weeks.  She will return to clinic as needed for any additional issues or concerns.  She verbalized understanding of the plan of care with no further questions.       Clinical Reference Documents Added to Patient Instructions         Document    ACTIVE RANGE OF MOTION EXERCISES, ARMS AND HANDS (ENGLISH)    EXERCISE BAND EXERCISES FOR THE ELBOW AND WRIST (ENGLISH)               Follow up in about 4 weeks (around 12/3/2024) for Reevaluation with repeat X-rays.      DISCLAIMER: This note may have been dictated using voice recognition software and may contain grammatical errors.     NOTE: Consult report sent to referring provider via [employer] EMR.

## 2024-11-06 ENCOUNTER — HOSPITAL ENCOUNTER (OUTPATIENT)
Dept: RADIOLOGY | Facility: HOSPITAL | Age: 59
Discharge: HOME | End: 2024-11-06
Attending: FAMILY MEDICINE
Payer: COMMERCIAL

## 2024-11-06 DIAGNOSIS — M79.605 LEFT LEG PAIN: ICD-10-CM

## 2024-11-06 PROCEDURE — 93971 EXTREMITY STUDY: CPT | Mod: TC,LT

## 2024-11-06 PROCEDURE — 76882 US LMTD JT/FCL EVL NVASC XTR: CPT | Mod: TC,LT

## 2024-11-22 ENCOUNTER — PATIENT MESSAGE (OUTPATIENT)
Dept: RESEARCH | Facility: HOSPITAL | Age: 59
End: 2024-11-22
Payer: COMMERCIAL

## 2024-12-02 ENCOUNTER — OFFICE VISIT (OUTPATIENT)
Dept: FAMILY MEDICINE | Facility: CLINIC | Age: 59
End: 2024-12-02
Payer: COMMERCIAL

## 2024-12-02 VITALS
SYSTOLIC BLOOD PRESSURE: 135 MMHG | DIASTOLIC BLOOD PRESSURE: 77 MMHG | BODY MASS INDEX: 27.08 KG/M2 | HEIGHT: 62 IN | OXYGEN SATURATION: 99 % | HEART RATE: 70 BPM | WEIGHT: 147.13 LBS | RESPIRATION RATE: 14 BRPM | TEMPERATURE: 99 F

## 2024-12-02 DIAGNOSIS — H05.221 ORBITAL EDEMA, RIGHT: ICD-10-CM

## 2024-12-02 DIAGNOSIS — R63.5 WEIGHT GAIN: ICD-10-CM

## 2024-12-02 DIAGNOSIS — R53.83 FATIGUE, UNSPECIFIED TYPE: Primary | ICD-10-CM

## 2024-12-02 DIAGNOSIS — R03.0 ELEVATED BLOOD PRESSURE READING: ICD-10-CM

## 2024-12-02 PROCEDURE — 3008F BODY MASS INDEX DOCD: CPT | Mod: CPTII,,, | Performed by: INTERNAL MEDICINE

## 2024-12-02 PROCEDURE — 99214 OFFICE O/P EST MOD 30 MIN: CPT | Mod: ,,, | Performed by: INTERNAL MEDICINE

## 2024-12-02 PROCEDURE — 1160F RVW MEDS BY RX/DR IN RCRD: CPT | Mod: CPTII,,, | Performed by: INTERNAL MEDICINE

## 2024-12-02 PROCEDURE — 1159F MED LIST DOCD IN RCRD: CPT | Mod: CPTII,,, | Performed by: INTERNAL MEDICINE

## 2024-12-02 PROCEDURE — 3044F HG A1C LEVEL LT 7.0%: CPT | Mod: CPTII,,, | Performed by: INTERNAL MEDICINE

## 2024-12-02 PROCEDURE — 3078F DIAST BP <80 MM HG: CPT | Mod: CPTII,,, | Performed by: INTERNAL MEDICINE

## 2024-12-02 PROCEDURE — 3075F SYST BP GE 130 - 139MM HG: CPT | Mod: CPTII,,, | Performed by: INTERNAL MEDICINE

## 2024-12-02 NOTE — PROGRESS NOTES
"Subjective:      Patient ID: Claudette M Malveaux [place] is a 59 y.o. female.    Chief Complaint: Follow-up (Insomnia, leg pain, right eye lid, ^BP, weight gain)    HPI    Last wellness with Diana BATOOL 7/3/24      Status post trip/fall playing tennis this year  Had L distal radius fracture  Status post surgery corrected 24 with at [facility]        Patient comes into the office today upset, reporting about 5 month history of weight gain, insomnia - trouble staying asleep, and low energy, fatigue. She reports sx worsened with not being able to exercise since injury but she is released to start doing more as of yesterday. She says diet is okay but more coffee, has a carb heavy/outside lunch, lean home cooked meal.     Family history of Breast Cancer in Mother      Past Surgery History: 2 c/s    Two adult children  Tobacco use: none     Mammogram:  neg for malignancy  PAP smear:  Diana, neg for malignancy  Colonoscopy:  polyps removed;   COVID 19 vaccine: completed 3 3/31/21 4/30/21 3/1/22  Shingrix 2 doses   (1621, 21)         Family history:   Mom: CAD, HTN, Breast Cancer, Smoker  Dad: Leukemia- diagnosed age 83  Sister: healthy   Brother: 2  (cerebral aneurysm, cardiac arrest); 2 living (HTN, healthy)  Healthy  Health Maintenance Due   Topic Date Due    TETANUS VACCINE  Never done    COVID-19 Vaccine ( season) 2024     Review of Systems   Constitutional:  Positive for activity change and fatigue.   Eyes:  Negative for photophobia, pain, discharge, redness, itching and visual disturbance.       Objective:     Vitals:    24 1011 24 1017   BP: (!) 142/82 135/77  Comment: at home BP   BP Location: Left arm Right arm   Patient Position: Sitting    Pulse: 70    Resp: 14    Temp: 98.7 °F (37.1 °C)    TempSrc: Temporal    SpO2: 99%    Weight: 66.7 kg (147 lb 1.6 oz)    Height: 5' 2" (1.575 m)      Physical Exam  Vitals and nursing note " reviewed.   Constitutional:       General: She is not in acute distress.     Appearance: Normal appearance. She is not ill-appearing.   HENT:      Head: Normocephalic and atraumatic.      Right Ear: Tympanic membrane normal.      Left Ear: Tympanic membrane normal.      Mouth/Throat:      Mouth: Mucous membranes are moist.   Eyes:      Pupils: Pupils are equal, round, and reactive to light.      Comments: Some notable swelling to R upper eyelid compared to left    Cardiovascular:      Rate and Rhythm: Normal rate and regular rhythm.   Pulmonary:      Effort: Pulmonary effort is normal. No respiratory distress.      Breath sounds: Normal breath sounds. No wheezing.   Musculoskeletal:      Cervical back: Neck supple.      Right lower leg: No edema.      Left lower leg: No edema.   Lymphadenopathy:      Cervical: No cervical adenopathy.   Neurological:      Mental Status: She is alert.       Assessment/Plan:     Patient has combination of sx today likely representing the changes to her lifestyle over past few months, recommend to try several things to improve this.   Trial of elimination diet for 3 weeks, no dairy, no gluten, no seed oils. Then we will reintroduce dairy back into the diet slowly and monitor sx. We will then have her work on a more whole food diet- mediterannean approach, less processed foods. Restart walking for exercise, outside ideally, stress management advised. Will f/u in 3-4 weeks to see how she's doing.   Encourage plenty of green vegetables, meats/protein, some fruit as well. Hydrate with water or green tea.   Supplements to take omega 3 fish oil, vitamin D and magnesium glycinate for inflammation and sleep.  Will also check labs for celiac disease, thyroid studies, cbc, testosterone levels, antibiodies given fatigue and swelling, if work up negative for periorbital edema, will order CT orbits to evaluate        1. Fatigue, unspecified type  -     CBC Auto Differential; Future; Expected date:  12/02/2024  -     Sedimentation rate; Future; Expected date: 12/02/2024  -     C-Reactive Protein; Future; Expected date: 12/02/2024  -     THYROID ANTIBODY PANEL; Future; Expected date: 12/02/2024  -     T4, Free; Future; Expected date: 12/02/2024  -     T3, Free (OLG); Future; Expected date: 12/02/2024  -     TSH; Future; Expected date: 12/02/2024  -     ANTINUCLEAR ANTIBODIES; Future; Expected date: 12/02/2024  -     CYCLIC CITRULLINATED PEPTIDE (CCP) ANTIBODY; Future; Expected date: 12/02/2024  -     Anti-DNA Ab, Double-Stranded; Future; Expected date: 12/02/2024  -     Testosterone; Future; Expected date: 12/02/2024  -     Cortisol; Future; Expected date: 12/02/2024    2. Elevated blood pressure reading  -     CBC Auto Differential; Future; Expected date: 12/02/2024  -     Sedimentation rate; Future; Expected date: 12/02/2024  -     C-Reactive Protein; Future; Expected date: 12/02/2024  -     THYROID ANTIBODY PANEL; Future; Expected date: 12/02/2024  -     T4, Free; Future; Expected date: 12/02/2024  -     T3, Free (OLG); Future; Expected date: 12/02/2024  -     TSH; Future; Expected date: 12/02/2024  -     ANTINUCLEAR ANTIBODIES; Future; Expected date: 12/02/2024  -     CYCLIC CITRULLINATED PEPTIDE (CCP) ANTIBODY; Future; Expected date: 12/02/2024  -     Anti-DNA Ab, Double-Stranded; Future; Expected date: 12/02/2024  -     Testosterone; Future; Expected date: 12/02/2024  -     Cortisol; Future; Expected date: 12/02/2024    3. Weight gain  -     CBC Auto Differential; Future; Expected date: 12/02/2024  -     Sedimentation rate; Future; Expected date: 12/02/2024  -     C-Reactive Protein; Future; Expected date: 12/02/2024  -     THYROID ANTIBODY PANEL; Future; Expected date: 12/02/2024  -     T4, Free; Future; Expected date: 12/02/2024  -     T3, Free (OLG); Future; Expected date: 12/02/2024  -     TSH; Future; Expected date: 12/02/2024  -     ANTINUCLEAR ANTIBODIES; Future; Expected date: 12/02/2024  -      CYCLIC CITRULLINATED PEPTIDE (CCP) ANTIBODY; Future; Expected date: 12/02/2024  -     Anti-DNA Ab, Double-Stranded; Future; Expected date: 12/02/2024  -     Testosterone; Future; Expected date: 12/02/2024  -     Cortisol; Future; Expected date: 12/02/2024    4. Orbital edema, right  -     CBC Auto Differential; Future; Expected date: 12/02/2024  -     Sedimentation rate; Future; Expected date: 12/02/2024  -     C-Reactive Protein; Future; Expected date: 12/02/2024  -     THYROID ANTIBODY PANEL; Future; Expected date: 12/02/2024  -     T4, Free; Future; Expected date: 12/02/2024  -     T3, Free (OLG); Future; Expected date: 12/02/2024  -     TSH; Future; Expected date: 12/02/2024  -     ANTINUCLEAR ANTIBODIES; Future; Expected date: 12/02/2024  -     CYCLIC CITRULLINATED PEPTIDE (CCP) ANTIBODY; Future; Expected date: 12/02/2024  -     Anti-DNA Ab, Double-Stranded; Future; Expected date: 12/02/2024  -     Testosterone; Future; Expected date: 12/02/2024  -     Cortisol; Future; Expected date: 12/02/2024       Follow up in about 4 weeks (around 12/30/2024) for Virtual Visit.    I spent a total of 50 minutes on the day of the visit.This includes face to face time and non-face to face time preparing to see the patient (eg, review of tests), obtaining and/or reviewing separately obtained history, documenting clinical information in the electronic or other health record, independently interpreting results and communicating results to the patient/family/caregiver, or care coordinator.

## 2024-12-04 ENCOUNTER — LAB VISIT (OUTPATIENT)
Dept: LAB | Facility: HOSPITAL | Age: 59
End: 2024-12-04
Attending: INTERNAL MEDICINE
Payer: COMMERCIAL

## 2024-12-04 DIAGNOSIS — S52.615A NONDISPLACED FRACTURE OF LEFT ULNA STYLOID PROCESS, INITIAL ENCOUNTER FOR CLOSED FRACTURE: ICD-10-CM

## 2024-12-04 DIAGNOSIS — H05.221 ORBITAL EDEMA, RIGHT: ICD-10-CM

## 2024-12-04 DIAGNOSIS — S52.532A CLOSED COLLES' FRACTURE OF LEFT RADIUS, INITIAL ENCOUNTER: Primary | ICD-10-CM

## 2024-12-04 DIAGNOSIS — R53.83 FATIGUE, UNSPECIFIED TYPE: ICD-10-CM

## 2024-12-04 DIAGNOSIS — R63.5 WEIGHT GAIN: ICD-10-CM

## 2024-12-04 DIAGNOSIS — R03.0 ELEVATED BLOOD PRESSURE READING: ICD-10-CM

## 2024-12-04 LAB
ANTINUCLEAR ANTIBODY SCREEN (OHS): NEGATIVE
BASOPHILS # BLD AUTO: 0.02 X10(3)/MCL
BASOPHILS NFR BLD AUTO: 0.5 %
CORTIS SERPL-SCNC: 9.9 UG/DL
CRP SERPL-MCNC: 2.6 MG/L
CYCLIC CITRULLINATED PEPTIDE (CCP) (OHS): 1.2 U/ML
DSDNA AB QUANT (OHS): 4.9 IU/ML
DSDNA AB QUANT (OHS): 6.9 IU/ML
ELIA CELIKEY IGA (TTG IGA) QUANTITATIVE: 0.6 U/ML
ELIA CELIKEY IGG (TTG IGG) QUANTITATIVE: 0.6 U/ML
EOSINOPHIL # BLD AUTO: 0.04 X10(3)/MCL (ref 0–0.9)
EOSINOPHIL NFR BLD AUTO: 0.9 %
ERYTHROCYTE [DISTWIDTH] IN BLOOD BY AUTOMATED COUNT: 12.6 % (ref 11.5–17)
ERYTHROCYTE [SEDIMENTATION RATE] IN BLOOD: 6 MM/HR (ref 0–20)
HCT VFR BLD AUTO: 39.6 % (ref 37–47)
HGB BLD-MCNC: 12.9 G/DL (ref 12–16)
IMM GRANULOCYTES # BLD AUTO: 0.01 X10(3)/MCL (ref 0–0.04)
IMM GRANULOCYTES NFR BLD AUTO: 0.2 %
LYMPHOCYTES # BLD AUTO: 1.5 X10(3)/MCL (ref 0.6–4.6)
LYMPHOCYTES NFR BLD AUTO: 34.1 %
MCH RBC QN AUTO: 32.5 PG (ref 27–31)
MCHC RBC AUTO-ENTMCNC: 32.6 G/DL (ref 33–36)
MCV RBC AUTO: 99.7 FL (ref 80–94)
MONOCYTES # BLD AUTO: 0.32 X10(3)/MCL (ref 0.1–1.3)
MONOCYTES NFR BLD AUTO: 7.3 %
NEUTROPHILS # BLD AUTO: 2.51 X10(3)/MCL (ref 2.1–9.2)
NEUTROPHILS NFR BLD AUTO: 57 %
NRBC BLD AUTO-RTO: 0 %
PLATELET # BLD AUTO: 225 X10(3)/MCL (ref 130–400)
PMV BLD AUTO: 10.9 FL (ref 7.4–10.4)
RBC # BLD AUTO: 3.97 X10(6)/MCL (ref 4.2–5.4)
T3FREE SERPL-MCNC: 2.66 PG/ML (ref 1.58–3.91)
T4 FREE SERPL-MCNC: 0.99 NG/DL (ref 0.7–1.48)
TESTOST SERPL-MCNC: 29.79 NG/DL (ref 12.4–35.75)
THYROGLOB AB SERPL IA-ACNC: 13 IU/ML
THYROID PEROXIDASE QUANT (OLG): 7 IU/ML
TSH SERPL-ACNC: 1.38 UIU/ML (ref 0.35–4.94)
WBC # BLD AUTO: 4.4 X10(3)/MCL (ref 4.5–11.5)

## 2024-12-04 PROCEDURE — 84403 ASSAY OF TOTAL TESTOSTERONE: CPT

## 2024-12-04 PROCEDURE — 86200 CCP ANTIBODY: CPT

## 2024-12-04 PROCEDURE — 36415 COLL VENOUS BLD VENIPUNCTURE: CPT

## 2024-12-04 PROCEDURE — 85025 COMPLETE CBC W/AUTO DIFF WBC: CPT

## 2024-12-04 PROCEDURE — 86225 DNA ANTIBODY NATIVE: CPT | Mod: 59

## 2024-12-04 PROCEDURE — 86364 TISS TRNSGLTMNASE EA IG CLAS: CPT

## 2024-12-04 PROCEDURE — 85652 RBC SED RATE AUTOMATED: CPT

## 2024-12-04 PROCEDURE — 84432 ASSAY OF THYROGLOBULIN: CPT

## 2024-12-04 PROCEDURE — 82533 TOTAL CORTISOL: CPT

## 2024-12-04 PROCEDURE — 86140 C-REACTIVE PROTEIN: CPT

## 2024-12-04 PROCEDURE — 86225 DNA ANTIBODY NATIVE: CPT

## 2024-12-04 PROCEDURE — 84481 FREE ASSAY (FT-3): CPT

## 2024-12-04 PROCEDURE — 84443 ASSAY THYROID STIM HORMONE: CPT

## 2024-12-04 PROCEDURE — 84439 ASSAY OF FREE THYROXINE: CPT

## 2024-12-27 ENCOUNTER — TELEPHONE (OUTPATIENT)
Dept: FAMILY MEDICINE | Facility: CLINIC | Age: 59
End: 2024-12-27
Payer: COMMERCIAL

## 2024-12-27 NOTE — TELEPHONE ENCOUNTER
----- Message from Carly sent at 12/27/2024 12:04 PM CST -----  Who Called: Claudette M Malveaux [place]    Patient is returning phone call    Who Left Message for Patient:   Does the patient know what this is regarding?:       Preferred Method of Contact: Phone Call  Patient's Preferred Phone Number on File: [phone]   Best Call Back Number, if different:  Additional Information:  returning call back about an appt

## 2024-12-27 NOTE — TELEPHONE ENCOUNTER
Spoke with pt  Pt ok with moving appt to Friday at 0800   Please move pt virtual appt to Friday January 3rd at 0800  Thanks

## 2025-01-03 ENCOUNTER — OFFICE VISIT (OUTPATIENT)
Dept: FAMILY MEDICINE | Facility: CLINIC | Age: 60
End: 2025-01-03
Payer: COMMERCIAL

## 2025-01-03 DIAGNOSIS — D72.819 LEUKOPENIA, UNSPECIFIED TYPE: ICD-10-CM

## 2025-01-03 DIAGNOSIS — H05.011 CELLULITIS OF RIGHT ORBITAL REGION: Primary | ICD-10-CM

## 2025-01-03 DIAGNOSIS — R53.83 FATIGUE, UNSPECIFIED TYPE: ICD-10-CM

## 2025-01-03 PROBLEM — Z12.4 PAP SMEAR FOR CERVICAL CANCER SCREENING: Status: RESOLVED | Noted: 2024-07-03 | Resolved: 2025-01-03

## 2025-01-03 NOTE — PROGRESS NOTES
TELEMEDICINE VISIT     Patient ID: Claudette M Malveaux Washington is a 59 y.o. female.  MRN: [id number]  : 1965    Subjective:        TELEMEDICINE  The patient location is: home  The chief complaint leading to consultation is: Fatigue     Visit type: Virtual visit with synchronous audio and video; difficulty with video connection today    Total time spent with patient: 15 minutes  20 minutes of total time spent on the encounter, which includes face to face time and non-face to face time preparing to see the patient (eg, review of tests), obtaining and/or reviewing separately obtained history, documenting clinical information in the electronic or other health record, independently interpreting results (not separately reported) and communicating results to the patient/family/caregiver, or care coordination (not separately reported).    Each patient to whom he or she provides medical services by telemedicine is:  (1) informed of the relationship between the physician and patient and the respective role of any other health care provider with respect to management of the patient; and (2) notified that he or she may decline to receive medical services by telemedicine and may withdraw from such care at any time.    HPI: HPI       Last wellness with Diana BATOOL 7/3/24        Status post trip/fall playing tennis this year  Had L distal radius fracture  Status post surgery corrected 24 withDr.Ramon at [facility] [place]     Follow up for fatigue, weight gain, orbital swelling  Work up for adrenal insuffiency, thyroid dysfunction, autoimmune screening negative, normal testosterone levels  Advised patient to work up an elimination diet, stop dairy, gluten and seed oils x 3 weeks, then reintroduce dairy slowly to see how she's feeling, we discussed exercise, movement, exposure to sunlight, stress management as well  Today is her f/u to discuss how sh'es feeling.      Patient reports she was doing well with dietary changes for about  3 weeks, then diet changed over holidays but reports improvement in sleep, energy, did see some weight loss about 4#. She says she is moving more, walking, she is getting outside, she is taking her supplements as discussed, feeling better, still having R eyelid swelling compared to L eyelid.    Health maintenance reviewed with the patient.  Health maintenance completed:  Health Maintenance Topics with due status: Not Due       Topic Last Completion Date    Colorectal Cancer Screening 2020    Cervical Cancer Screening 2024    Hemoglobin A1c (Diabetic Prevention Screening) 2024    Mammogram 2024    Lipid Panel 2024    RSV Vaccine (Age 60+ and Pregnant patients) Not Due      Health maintenance due:  Health Maintenance Due   Topic Date Due    TETANUS VACCINE  Never done    Pneumococcal Vaccines (Age 50+) (1 of  - PCV) Never done    COVID-19 Vaccine (2024- season) 2024      ROS:  Review of Systems   Constitutional:  Negative for activity change and unexpected weight change.   HENT:  Negative for hearing loss, rhinorrhea and trouble swallowing.    Eyes:  Negative for discharge and visual disturbance.   Respiratory:  Negative for chest tightness and wheezing.    Cardiovascular:  Negative for chest pain and palpitations.   Gastrointestinal:  Negative for blood in stool, constipation, diarrhea and vomiting.   Endocrine: Negative for polydipsia and polyuria.   Genitourinary:  Negative for difficulty urinating, dysuria, hematuria and menstrual problem.   Musculoskeletal:  Negative for arthralgias, joint swelling and neck pain.   Neurological:  Negative for weakness and headaches.   Psychiatric/Behavioral:  Negative for confusion and dysphoric mood.       History:     Past Medical History:   Diagnosis Date    PONV (postoperative nausea and vomiting)     Radius fracture     Raynaud's syndrome       Past Surgical History:   Procedure Laterality Date     SECTION      x 2      SECTION  Mar 1997 and May 2000    OPEN REDUCTION AND INTERNAL FIXATION (ORIF) OF FRACTURE OF RADIUS Left 2024    Procedure: ORIF, FRACTURE, RADIUS - Biomet;  Surgeon: Leif Navarro MD;  Location: [facility] OR;  Service: Orthopedics;  Laterality: Left;     Family History   Problem Relation Name Age of Onset    Breast cancer Mother Glendy Rodas 65    Heart disease Mother Glendy Rodas     Cancer Mother Glendy Rodas         breast cancer; angiosarcoma    Leukemia Father Frederick Rodas     Hypertension Father Frederick Rodas     Hearing loss Father Frederick Rodas         hard of hearing    Cerebral aneurysm Brother      Breast cancer Paternal Grandmother Aditi Rodas 75    Cancer Paternal Grandmother Aditi Rodas         breast cancer    Hearing loss Paternal Grandmother Aditi Rodas         hard of hearing    Colon cancer Paternal Aunt  70    Cancer Paternal Aunt Glenys Cohen         colon cancer    Hearing loss Paternal Aunt Glenys Cohen         hard of hearing    Early death Brother Holland Rodas         cerebral aneurysm age 35    Early death Brother Lionel Rodas         cardiac arrest age 50    Heart disease Brother Lionel Rodas       Social History     Tobacco Use    Smoking status: Never    Smokeless tobacco: Never   Substance and Sexual Activity    Alcohol use: Yes     Alcohol/week: 12.0 standard drinks of alcohol     Types: 10 Glasses of wine, 2 Drinks containing 0.5 oz of alcohol per week     Comment: Socially (3-4 times a week)    Drug use: Yes     Types: Oxycodone    Sexual activity: Yes     Partners: Male     Birth control/protection: None          Allergies:   Review of patient's allergies indicates:   Allergen Reactions    Sulfa (sulfonamide antibiotics) Hives and Itching     Objective:   There were no vitals filed for this visit.      Physical Examination: Physical exam was not performed during this virtual visit.  Physical Exam  Vitals and nursing note reviewed.    Constitutional:       Appearance: Normal appearance.   Neurological:      Mental Status: She is alert.         Labs:   Diagnostic Tests:  Significant Imaging: I have reviewed and interpreted all pertinent imaging results/findings.  US Extremity Non Vascular Limited Left  Narrative: EXAMINATION:  US EXTREMITY NON VASCULAR LIMITED LEFT    CLINICAL HISTORY:  Pain in left leg    TECHNIQUE:  Ultrasound of the posterior thigh    COMPARISON:  None    FINDINGS:  Mass or fluid collection is not seen.  No focal lesions are demonstrated.  Impression: No abnormalities are demonstrated on the presented images.    Electronically signed by: Phillip Engel MD  Date:    11/06/2024  Time:    16:38  US Lower Extremity Veins Left  Narrative: EXAMINATION:  US LOWER EXTREMITY VEINS LEFT    CLINICAL HISTORY:  Pain in left leg.    COMPARISON:  None    FINDINGS:  Multiple grayscale and color Doppler sonographic images were acquired through the left lower extremity deep venous system. The visualized left common femoral, profunda femoral, superficial femoral, popliteal, peroneal, posterior tibial, anterior tibial, and greater saphenous veins demonstrate compressibility where anatomically appropriate and possible, display phasic waveforms, and displayed augmentation by Doppler.  Impression: 1. No evidence of deep venous thrombosis in the visualized deep veins of the left lower extremity is sonographically demonstrated.    Electronically signed by: Александр Gregory MD  Date:    11/06/2024  Time:    15:20      Laboratory Data:  All pertinent labs have been reviewed.  I have reviewed the following records today:     Details:   [x] Labs [] Internal  [] External    [] Micro [] Internal  [] External    [] Pathology [] Internal  [] External    [x] Imaging [] Internal  [] External    [x] Cardiology Procedures [] Internal  [] External    [x] Provider Records [] Internal  [] External    [] Other [] Internal  [] External      Labs:   Lab Results   Component  Value Date    WBC 4.40 (L) 12/04/2024    RBC 3.97 (L) 12/04/2024    HGB 12.9 12/04/2024    HCT 39.6 12/04/2024    MCV 99.7 (H) 12/04/2024    MCH 32.5 (H) 12/04/2024     12/04/2024     09/26/2024    K 3.8 09/26/2024     09/26/2024    BUN 15.0 09/26/2024    CREATININE 0.73 09/26/2024    AST 23 09/26/2024    ALT 11 09/26/2024    BILITOT 0.6 09/26/2024    TSH 1.379 12/04/2024    HGBA1C 4.8 07/03/2024      Medications:     Assessment:     1. Cellulitis of right orbital region    2. Leukopenia, unspecified type    3. Fatigue, unspecified type      Plan:   Claudette was seen today for fatigue.    Diagnoses and all orders for this visit:    Cellulitis of right orbital region  -     CT Orbits Sella Post Fossa Without Cont; Future  Patient with continued eyelid swelling  Recommend CT scan of orbit of eye to evaluate  Pending results will discuss next step in treatment  Thyroid studies done last month were unremarkable  Leukopenia, unspecified type  -     CBC Auto Differential; Future  Patient is consistent with supplement  Check CBC in 1 month  Fatigue, unspecified type  Patient with improvement in fatigue  Continue dietary changes- mediterranean, low glycemic index foods, exercise/movement, f/u in 3 mos to see how you are feeling        Follow Up:   Follow up in about 3 months (around 4/3/2025) for Follow Up - Chronic Conditions.    I spent greater than 20 minutes today both in chart review and greater than 50% of that time in discussion with the patient regarding health maintenance, diagnoses, diagnostic tests, medications, treatments, symptom management, expected results and adverse effects. Patient verbalized understanding and all questions were answered.

## 2025-04-07 ENCOUNTER — LAB VISIT (OUTPATIENT)
Dept: LAB | Facility: HOSPITAL | Age: 60
End: 2025-04-07
Attending: INTERNAL MEDICINE
Payer: COMMERCIAL

## 2025-04-07 DIAGNOSIS — D72.819 LEUKOPENIA, UNSPECIFIED TYPE: ICD-10-CM

## 2025-04-07 LAB
BASOPHILS # BLD AUTO: 0.03 X10(3)/MCL
BASOPHILS NFR BLD AUTO: 0.6 %
EOSINOPHIL # BLD AUTO: 0.04 X10(3)/MCL (ref 0–0.9)
EOSINOPHIL NFR BLD AUTO: 0.8 %
ERYTHROCYTE [DISTWIDTH] IN BLOOD BY AUTOMATED COUNT: 12.5 % (ref 11.5–17)
HCT VFR BLD AUTO: 37.7 % (ref 37–47)
HGB BLD-MCNC: 12.3 G/DL (ref 12–16)
IMM GRANULOCYTES # BLD AUTO: 0.01 X10(3)/MCL (ref 0–0.04)
IMM GRANULOCYTES NFR BLD AUTO: 0.2 %
LYMPHOCYTES # BLD AUTO: 1.83 X10(3)/MCL (ref 0.6–4.6)
LYMPHOCYTES NFR BLD AUTO: 38 %
MCH RBC QN AUTO: 32.4 PG (ref 27–31)
MCHC RBC AUTO-ENTMCNC: 32.6 G/DL (ref 33–36)
MCV RBC AUTO: 99.2 FL (ref 80–94)
MONOCYTES # BLD AUTO: 0.4 X10(3)/MCL (ref 0.1–1.3)
MONOCYTES NFR BLD AUTO: 8.3 %
NEUTROPHILS # BLD AUTO: 2.51 X10(3)/MCL (ref 2.1–9.2)
NEUTROPHILS NFR BLD AUTO: 52.1 %
NRBC BLD AUTO-RTO: 0 %
PLATELET # BLD AUTO: 217 X10(3)/MCL (ref 130–400)
PMV BLD AUTO: 11.2 FL (ref 7.4–10.4)
RBC # BLD AUTO: 3.8 X10(6)/MCL (ref 4.2–5.4)
WBC # BLD AUTO: 4.82 X10(3)/MCL (ref 4.5–11.5)

## 2025-04-07 PROCEDURE — 85025 COMPLETE CBC W/AUTO DIFF WBC: CPT

## 2025-04-07 PROCEDURE — 36415 COLL VENOUS BLD VENIPUNCTURE: CPT

## 2025-04-08 ENCOUNTER — RESULTS FOLLOW-UP (OUTPATIENT)
Dept: FAMILY MEDICINE | Facility: CLINIC | Age: 60
End: 2025-04-08

## 2025-04-09 ENCOUNTER — OFFICE VISIT (OUTPATIENT)
Dept: FAMILY MEDICINE | Facility: CLINIC | Age: 60
End: 2025-04-09
Payer: COMMERCIAL

## 2025-04-09 VITALS
RESPIRATION RATE: 18 BRPM | HEART RATE: 68 BPM | OXYGEN SATURATION: 99 % | BODY MASS INDEX: 25.51 KG/M2 | HEIGHT: 62 IN | SYSTOLIC BLOOD PRESSURE: 120 MMHG | WEIGHT: 138.63 LBS | DIASTOLIC BLOOD PRESSURE: 80 MMHG | TEMPERATURE: 97 F

## 2025-04-09 DIAGNOSIS — D64.9 NORMOCYTIC ANEMIA: ICD-10-CM

## 2025-04-09 DIAGNOSIS — Z13.6 ENCOUNTER FOR LIPID SCREENING FOR CARDIOVASCULAR DISEASE: ICD-10-CM

## 2025-04-09 DIAGNOSIS — D72.819 LEUKOPENIA, UNSPECIFIED TYPE: Primary | ICD-10-CM

## 2025-04-09 DIAGNOSIS — R73.01 ELEVATED FASTING GLUCOSE: ICD-10-CM

## 2025-04-09 DIAGNOSIS — Z13.220 ENCOUNTER FOR LIPID SCREENING FOR CARDIOVASCULAR DISEASE: ICD-10-CM

## 2025-04-09 DIAGNOSIS — E55.9 VITAMIN D DEFICIENCY: ICD-10-CM

## 2025-04-09 DIAGNOSIS — Z83.438 FAMILY HISTORY OF HYPERLIPIDEMIA: ICD-10-CM

## 2025-04-09 DIAGNOSIS — Z83.49 FAMILY HISTORY OF THYROID DISEASE: ICD-10-CM

## 2025-04-09 DIAGNOSIS — Z00.00 WELLNESS EXAMINATION: Primary | ICD-10-CM

## 2025-04-09 PROBLEM — R03.0 ELEVATED BLOOD PRESSURE READING: Status: RESOLVED | Noted: 2024-07-03 | Resolved: 2025-04-09

## 2025-04-09 PROCEDURE — 3074F SYST BP LT 130 MM HG: CPT | Mod: CPTII,,, | Performed by: INTERNAL MEDICINE

## 2025-04-09 PROCEDURE — 1160F RVW MEDS BY RX/DR IN RCRD: CPT | Mod: CPTII,,, | Performed by: INTERNAL MEDICINE

## 2025-04-09 PROCEDURE — 1159F MED LIST DOCD IN RCRD: CPT | Mod: CPTII,,, | Performed by: INTERNAL MEDICINE

## 2025-04-09 PROCEDURE — 3079F DIAST BP 80-89 MM HG: CPT | Mod: CPTII,,, | Performed by: INTERNAL MEDICINE

## 2025-04-09 PROCEDURE — 3008F BODY MASS INDEX DOCD: CPT | Mod: CPTII,,, | Performed by: INTERNAL MEDICINE

## 2025-04-09 PROCEDURE — 99213 OFFICE O/P EST LOW 20 MIN: CPT | Mod: ,,, | Performed by: INTERNAL MEDICINE

## 2025-04-09 NOTE — PROGRESS NOTES
"Subjective:      Patient ID: Claudette M Malveaux Washington is a 59 y.o. female.    Chief Complaint: Follow-up (3 mos f/u for chronic care mgmt. )    HPI  Last wellness with Diana RENAE 7/3/24     Status post trip/fall playing tennis 2024  Had L distal radius fracture  [place] [facility] [place] is doing well today  No acute issues reported  She remains gluten free, dairy free, reducing sugar and alcohol, feels better, back playing tennis again as well    Health Maintenance Due   Topic Date Due    TETANUS VACCINE  Never done    Pneumococcal Vaccines (Age 50+) (1 of 1 - PCV) Never done    Mammogram  07/03/2025     Review of Systems   Constitutional:  Negative for chills and fever.   HENT:  Negative for congestion, sinus pressure and sore throat.    Respiratory:  Negative for cough and shortness of breath.    Cardiovascular:  Negative for chest pain and palpitations.   Gastrointestinal:  Negative for abdominal pain and nausea.   Skin:  Negative for rash.       Objective:     Vitals:    04/09/25 0945   BP: 120/80   BP Location: Left arm   Patient Position: Sitting   Pulse: 68   Resp: 18   Temp: 97.1 °F (36.2 °C)   TempSrc: Temporal   SpO2: 99%   Weight: 62.9 kg (138 lb 9.6 oz)   Height: 5' 2.01" (1.575 m)     Physical Exam  Vitals and nursing note reviewed.   Constitutional:       General: She is not in acute distress.     Appearance: Normal appearance. She is not ill-appearing.   HENT:      Head: Normocephalic and atraumatic.   Neurological:      Mental Status: She is alert.   Psychiatric:         Behavior: Behavior normal.       Assessment/Plan:     1. Leukopenia, unspecified type  resolved  2. Normocytic anemia  Stable at patient baseline  Ok to d/c folic acid       Follow up in about 6 months (around 10/9/2025) for Annual Wellness with lab .    I spent a total of 15 minutes on the day of the visit.This includes face to face time and non-face to face time preparing to see " the patient (eg, review of tests), obtaining and/or reviewing separately obtained history, documenting clinical information in the electronic or other health record, independently interpreting results and communicating results to the patient/family/caregiver, or care coordinator.

## 2025-07-28 ENCOUNTER — HOSPITAL ENCOUNTER (OUTPATIENT)
Dept: RADIOLOGY | Facility: HOSPITAL | Age: 60
Discharge: HOME | End: 2025-07-28
Payer: COMMERCIAL

## 2025-07-28 DIAGNOSIS — Z91.89 AT HIGH RISK FOR BREAST CANCER: ICD-10-CM

## 2025-07-28 DIAGNOSIS — Z80.3 FAMILY HISTORY OF BREAST CANCER: ICD-10-CM

## 2025-07-28 DIAGNOSIS — Z12.31 SCREENING MAMMOGRAM FOR BREAST CANCER: ICD-10-CM

## 2025-07-28 PROCEDURE — 77063 BREAST TOMOSYNTHESIS BI: CPT | Mod: 26,,, | Performed by: RADIOLOGY

## 2025-07-28 PROCEDURE — 77067 SCR MAMMO BI INCL CAD: CPT | Mod: TC

## 2025-07-28 PROCEDURE — 77067 SCR MAMMO BI INCL CAD: CPT | Mod: 26,,, | Performed by: RADIOLOGY

## 2025-07-31 NOTE — PROGRESS NOTES
[facility] Breast Surg  Breast Surgical Oncology  Patient Office Visit - H&P      Referring Provider: No ref. provider found  PCP: Rickie Pedraza MD   Care Team:  OBGYN: No data on file.    Chief Complaint:   Chief Complaint   Patient presents with    Follow-up     Patient reports no breast related concerns        Subjective:     Interval:  08/04/2025 patient here for annual high-risk follow-up today.  She is doing well today.  She currently denies any new breast complaints today.  She did undergo breast MRI in January which resulted as negative and screening mammogram in July which resulted as negative.  Patient is still living active and healthy lifestyle.  She currently denies any significant interval changes or changes in family history.  She has no new questions or concerns today.    HPI:  Claudette M Malveaux Washington is a 60 y.o. female who presents on 7/30/2024 for evaluation of risk for breast cancer. Based on the Tyrer-Cuzick Breast Cancer Risk Model, her lifetime risk is calculated to be 34.4% and Tere 5 year risk score 2.2%.    Patient is doing well today. She currently denies any breast issues including rashes, redness, pain, swelling, nipple discharge, or new lumps/masses.  She underwent screening mammogram earlier this month which resulted as negative.  She has never undergone any prior breast surgeries or breast biopsies.  She has not undergone genetic testing.  Patient is postmenopausal and she went through menopause around the age of 52.  She performs regular self-breast exams.  Patient states she lives a relatively healthy lifestyle.  She exercises by playing tennis with her niece a couple of times per week.  She does not smoke and she drinks alcohol in moderation.  She has no other questions or concerns today.      Of note, patient's mother was diagnosed with breast cancer at the age of 65.  Her mother was treated with lumpectomy, radiation, and chemoprevention.   Patient states her mother was in remission for about 5 years before she developed angiosarcoma of her breast which she passed away from shortly after diagnosis.  Patient states angiosarcoma was caused by radiation.  Patient's father was diagnosed with leukemia at age 82.  Patient's maternal grandmother was diagnosed with breast cancer at the age of 80.  She has a paternal aunt who was diagnosed with colon cancer at the age of 60.  Patient states none of these family members were genetically tested.    Imagin/3/2024 SC MG - There is no mammographic evidence of malignancy. BI-RADS: 1 Negative   2025 Breast MRI - No MR evidence of malignancy in either breast. BI-RADS: 1 Negative   2025 SC MG - There is no mammographic evidence of malignancy. BI-RADS: 1 Negative     Pathology:   none    OB/GYN History:  Age at Menarche Onset: 15  Menopausal Status: postmenopausal, LMP: No LMP recorded. Patient is postmenopausal.  Hysterectomy/Oophorectomy: NA  Hormonal birth control (duration): 10 years   Pregnancy History:   Age at first live birth: 31  Hormone Replacement Therapy: No, none    Other:  MG breast density: BIRADS D   Prior thoracic RT: none  Genetic testing: none  Ashkenazi [employer]ist descent: No    Family History:  Family History   Problem Relation Name Age of Onset    Breast cancer Mother Glendy Rodas 65    Heart disease Mother Glendy Rodas     Cancer Mother Glendy Rodas         breast cancer; angiosarcoma    Leukemia Father Frederick Rodas     Hypertension Father Frederick Rodas     Hearing loss Father Frederick Rodas         hard of hearing    Cerebral aneurysm Brother      Breast cancer Paternal Grandmother Aditi Ramonveaux 75    Cancer Paternal Grandmother Aditi Ramonveaux         breast cancer    Hearing loss Paternal Grandmother Aditi Malveaux         hard of hearing    Colon cancer Paternal Aunt  70    Cancer Paternal Aunt Glenys Cohen         colon cancer    Hearing loss Paternal Aunt Glenys  Noel         hard of hearing    Early death Brother Holland Rodas         cerebral aneurysm age 35    Early death Brother Lionel Rodas         cardiac arrest age 50    Heart disease Brother Lionel Rodas         Patient History:  Past Medical History:   Diagnosis Date    PONV (postoperative nausea and vomiting)     Radius fracture     Raynaud's syndrome        Past Surgical History:   Procedure Laterality Date     SECTION      x 2     SECTION  Mar 1997 and May 2000    OPEN REDUCTION AND INTERNAL FIXATION (ORIF) OF FRACTURE OF RADIUS Left 2024    Procedure: ORIF, FRACTURE, RADIUS - Biomet;  Surgeon: Leif Navarro MD;  Location: [facility];  Service: Orthopedics;  Laterality: Left;       Social History     Socioeconomic History    Marital status:    Tobacco Use    Smoking status: Never    Smokeless tobacco: Never   Substance and Sexual Activity    Alcohol use: Yes     Alcohol/week: 6.0 standard drinks of alcohol     Types: 6 Glasses of wine per week     Comment: Socially (3-4 times a week)    Drug use: Yes     Types: Oxycodone    Sexual activity: Yes     Partners: Male     Birth control/protection: None     Social Drivers of Health     Financial Resource Strain: Low Risk  (2025)    Overall Financial Resource Strain (CARDIA)     Difficulty of Paying Living Expenses: Not very hard   Food Insecurity: No Food Insecurity (2025)    Hunger Vital Sign     Worried About Running Out of Food in the Last Year: Never true     Ran Out of Food in the Last Year: Never true   Transportation Needs: No Transportation Needs (2025)    PRAPARE - Transportation     Lack of Transportation (Medical): No     Lack of Transportation (Non-Medical): No   Physical Activity: Sufficiently Active (2025)    Exercise Vital Sign     Days of Exercise per Week: 2 days     Minutes of Exercise per Session: 150+ min   Stress: No Stress Concern Present (2025)    [employer] -  "Occupational Stress Questionnaire     Feeling of Stress : Only a little   Housing Stability: Low Risk  (4/7/2025)    Housing Stability Vital Sign     Unable to Pay for Housing in the Last Year: No     Homeless in the Last Year: No       Immunization History   Administered Date(s) Administered    COVID-19, MRNA, LN-S, PF (MODERNA FULL 0.5 ML DOSE) 03/31/2021, 04/30/2021, 03/01/2022    Influenza - Quadrivalent - PF *Preferred* (6 months and older) 09/24/2022    Influenza - Trivalent - Fluarix, Flulaval, Fluzone, Afluria - PF 10/18/2024    Zoster Recombinant 08/17/2021, 11/16/2021       Medications/Allergies:  No current outpatient medications on file.     Review of patient's allergies indicates:   Allergen Reactions    Sulfa (sulfonamide antibiotics) Hives and Itching       Review of Systems:  All pertinent history mentioned in HPI.     Objective:     Vitals:  Vitals:    08/04/25 1104   BP: 127/83   BP Location: Left arm   Patient Position: Sitting   Pulse: 68   Resp: 18   Temp: 97.8 °F (36.6 °C)   TempSrc: Oral   SpO2: 100%   Weight: 61.7 kg (136 lb)   Height: 5' 2" (1.575 m)         Body mass index is 24.87 kg/m².     Physical Exam:  General: The patient is awake, alert and oriented times three. The patient is well nourished and in no acute distress.  Neck: There is no evidence of palpable cervical, supraclavicular or axillary adenopathy. The neck is supple. The thyroid is not enlarged.  Musculoskeletal: The patient has a normal range of motion of her bilateral upper extremities.  Chest: Examination of the chest wall fails to reveal any obvious abnormalities.  The lungs are clear to auscultation bilaterally without rales, rhonchi, or wheezing.  Cardiovascular: The heart has a regular rate and rhythm without murmurs, gallops or rubs.  Breast:   Right:  Examination of right breast fails to reveal any dominant masses or areas of significant focal nodularity. The nipple is everted without evidence of discharge. There is " no skin dimpling with movement of the pectoralis. There is no significant skin changes overlying the breast.   Left: Examination of the left breast fails to reveal any dominant masses or areas of significant focal nodularity. The nipple is everted without evidence of discharge. There is no skin dimpling with movement of the pectoralis. There are no significant skin changes overlying the breast.  Abdomen: The abdomen is soft, flat, nontender and nondistended with no palpable masses or organomegaly.  Integumentary: no rashes or skin lesions present  Neurologic: cranial nerves intact, no signs of peripheral neurological deficit, motor/sensory function intact    Assessment:     Patient Active Problem List   Diagnosis    Family history of breast cancer in mother        Claudette was seen today for follow-up.    Diagnoses and all orders for this visit:    At high risk for breast cancer  -     Mammo Digital Screening Bilat w/ Jf (XPD); Future  -     US BREAST SCREENING BILATERAL; Future    Screening mammogram for breast cancer  -     Mammo Digital Screening Bilat w/ Jf (XPD); Future    Family history of breast cancer  -     Mammo Digital Screening Bilat w/ Jf (XPD); Future  -     US BREAST SCREENING BILATERAL; Future    Dense breast tissue  -     US BREAST SCREENING BILATERAL; Future              Plan:       1. Lifestyle - Healthy lifestyle guidelines were reviewed. She was encouraged to engage in regular exercise, maintain a healthy body weight, and avoid excessive alcohol consumption. Healthy nutritional guidelines were also discussed. Self-breast examination was reviewed with the patient in detail and she was encouraged to perform this on a monthly basis.    2. Surveillance - Patient would like to alternate breast MRIs every other year. Next breast MRI will be due in January 2027. Instead, this coming year, patient would like to undergo screening mammogram with bilateral supplemental ultrasound as part of her  high-risk screening. Screening mammogram with ultrasound will be due in July 2026. RTC in 1 year.    3. Prevention - We had a brief discussion/education about indications for preventative mastectomy or chemoprevention. Patient displayed no interest in starting chemoprevention.  Patient's 5 year Tere score is 2.2%.      4. Genetics - According to NCCN guidelines, she does not currently meet criteria for genetic testing. Will continue to monitor.    5. Other routine screening exams:   -  Recommend annual follow up with PCP   -  Recommend annual follow up with GYN for pap smears/gynecologic exams and CBE   -  GI: Recommend start colorectal cancer screening at age 45 in average risk individuals. This can be done either with a sensitive test that looks for signs of cancer in a persons stool (a stool-based test), or with an exam that looks at the colon and rectum (a visual exam). Patient's at an increased risk for CRC (ex. Personal or family history of CRC, certain types of polyps, genetic disorders, IBD, or hx of abdominal radiation) should start screening prior to age 45.         All of her questions were answered. She was advised to call if she develops any questions or concerns.    Gila Acuña PA-C     --------------------------------------------------------------------------------------------------------------    Total time on the date of the visit ranged from 30-39 mins (89144). Total time includes both face-to-face and non-face-to-face time personally spent by myself on the day of the visit.    Non-face-to-face time included:  _X_ preparing to see the patient such as reviewing the patient record  __ obtaining and reviewing separately obtained history  _X_ independently interpreting results  _X_ documenting clinical information in electronic health record.    Face-to-face time included:  _X_ performing an appropriate history and examination  _X_ communicating results to the patient  _X_ counseling and educating  the patient  __ ordering appropriate medications  __ ordering appropriate tests  _X_ ordering appropriate procedures (including follow-up)  _X_ answering any questions the patient had    Total Time spent on date of visit: 35 minutes

## 2025-08-04 ENCOUNTER — OFFICE VISIT (OUTPATIENT)
Dept: SURGERY | Facility: CLINIC | Age: 60
End: 2025-08-04
Payer: COMMERCIAL

## 2025-08-04 VITALS
SYSTOLIC BLOOD PRESSURE: 127 MMHG | BODY MASS INDEX: 25.03 KG/M2 | HEIGHT: 62 IN | HEART RATE: 68 BPM | WEIGHT: 136 LBS | OXYGEN SATURATION: 100 % | DIASTOLIC BLOOD PRESSURE: 83 MMHG | RESPIRATION RATE: 18 BRPM | TEMPERATURE: 98 F

## 2025-08-04 DIAGNOSIS — Z12.31 SCREENING MAMMOGRAM FOR BREAST CANCER: ICD-10-CM

## 2025-08-04 DIAGNOSIS — Z91.89 AT HIGH RISK FOR BREAST CANCER: Primary | ICD-10-CM

## 2025-08-04 DIAGNOSIS — Z80.3 FAMILY HISTORY OF BREAST CANCER: ICD-10-CM

## 2025-08-04 DIAGNOSIS — R92.30 DENSE BREAST TISSUE: ICD-10-CM

## 2025-08-04 PROCEDURE — 3008F BODY MASS INDEX DOCD: CPT | Mod: CPTII,S$GLB,,

## 2025-08-04 PROCEDURE — 1159F MED LIST DOCD IN RCRD: CPT | Mod: CPTII,S$GLB,,

## 2025-08-04 PROCEDURE — 99999 PR PBB SHADOW E&M-EST. PATIENT-LVL IV: CPT | Mod: PBBFAC,,,

## 2025-08-04 PROCEDURE — 1160F RVW MEDS BY RX/DR IN RCRD: CPT | Mod: CPTII,S$GLB,,

## 2025-08-04 PROCEDURE — 3074F SYST BP LT 130 MM HG: CPT | Mod: CPTII,S$GLB,,

## 2025-08-04 PROCEDURE — 99214 OFFICE O/P EST MOD 30 MIN: CPT | Mod: S$GLB,,,

## 2025-08-04 PROCEDURE — 3079F DIAST BP 80-89 MM HG: CPT | Mod: CPTII,S$GLB,,

## (undated) DEVICE — BANDAGE VELCLOSE ELAS 4INX5YD

## (undated) DEVICE — PAD CAST SOF-ROL RAYON 4INX4YD

## (undated) DEVICE — SOL NACL IRR 1000ML BTL

## (undated) DEVICE — BIT DRILL DVR 2.2MM

## (undated) DEVICE — SUT ETHILON 3-0 PS2 18 BLK

## (undated) DEVICE — Device

## (undated) DEVICE — POWDER ARISTA AH 3G

## (undated) DEVICE — APPLICATOR CHLORAPREP ORN 26ML

## (undated) DEVICE — CUFF ATS 2 PORT SNGL BLDR 18IN

## (undated) DEVICE — ELECTRODE PATIENT RETURN DISP

## (undated) DEVICE — GLOVE SENSICARE PI GRN 9

## (undated) DEVICE — COVER EQUIPMENT 36X25

## (undated) DEVICE — DRAPE HAND STERILE

## (undated) DEVICE — KIT SURGICAL TURNOVER

## (undated) DEVICE — GLOVE SENSICARE PI GRN 6.5

## (undated) DEVICE — SUT VICRYL 2-0 8-18 CP-2

## (undated) DEVICE — WIRE KIRSCHNER 1.6MM 6IN SS
Type: IMPLANTABLE DEVICE | Site: RADIUS | Status: NON-FUNCTIONAL
Removed: 2024-09-30

## (undated) DEVICE — GOWN POLY REINF X-LONG 2XL

## (undated) DEVICE — DRESSING XEROFORM NONADH 1X8IN

## (undated) DEVICE — DRAPE STERI U-SHAPED 47X51IN

## (undated) DEVICE — GLOVE SENSICARE PI MICRO 6.5

## (undated) DEVICE — GLOVE SENSICARE PI MICRO 8.5